# Patient Record
Sex: MALE | Race: WHITE | Employment: OTHER | URBAN - NONMETROPOLITAN AREA
[De-identification: names, ages, dates, MRNs, and addresses within clinical notes are randomized per-mention and may not be internally consistent; named-entity substitution may affect disease eponyms.]

---

## 2020-08-31 ENCOUNTER — VIRTUAL VISIT (OUTPATIENT)
Dept: FAMILY MEDICINE CLINIC | Age: 65
End: 2020-08-31
Payer: COMMERCIAL

## 2020-08-31 DIAGNOSIS — I10 ESSENTIAL HYPERTENSION: ICD-10-CM

## 2020-08-31 DIAGNOSIS — E11.9 DIABETES MELLITUS TYPE 2, DIET-CONTROLLED (HCC): Primary | ICD-10-CM

## 2020-08-31 DIAGNOSIS — I65.21 ATHEROSCLEROSIS OF RIGHT CAROTID ARTERY: ICD-10-CM

## 2020-08-31 DIAGNOSIS — I67.2 CEREBRAL ATHEROSCLEROSIS: ICD-10-CM

## 2020-08-31 DIAGNOSIS — Z91.199 NON-COMPLIANCE: ICD-10-CM

## 2020-08-31 PROCEDURE — 99443 PR PHYS/QHP TELEPHONE EVALUATION 21-30 MIN: CPT | Performed by: FAMILY MEDICINE

## 2020-08-31 RX ORDER — ATORVASTATIN CALCIUM 80 MG/1
80 TABLET, FILM COATED ORAL DAILY
COMMUNITY
End: 2021-10-13 | Stop reason: SDUPTHER

## 2020-08-31 RX ORDER — METFORMIN HYDROCHLORIDE 500 MG/1
2 TABLET, EXTENDED RELEASE ORAL
COMMUNITY
Start: 2020-08-24 | End: 2021-03-21

## 2020-08-31 RX ORDER — LOSARTAN POTASSIUM AND HYDROCHLOROTHIAZIDE 12.5; 1 MG/1; MG/1
1 TABLET ORAL DAILY
COMMUNITY
Start: 2020-01-19 | End: 2021-10-13 | Stop reason: SDUPTHER

## 2020-08-31 RX ORDER — BISMUTH SUBSALICYLATE 262 MG
1 TABLET,CHEWABLE ORAL DAILY
COMMUNITY

## 2020-08-31 RX ORDER — ASPIRIN 81 MG/1
325 TABLET ORAL DAILY
COMMUNITY

## 2020-08-31 RX ORDER — MELATONIN
DAILY
COMMUNITY

## 2020-08-31 RX ORDER — CLOPIDOGREL BISULFATE 75 MG/1
75 TABLET ORAL DAILY
COMMUNITY
Start: 2020-04-16 | End: 2021-04-16

## 2020-08-31 NOTE — PROGRESS NOTES
Evelia Oconnor is a 60 y.o. male, evaluated via audio-only technology on 8/31/2020 for Blood sugar problem  . Assessment & Plan:   Disease noncompliance to medication diabetes mellitus hypertension hyperlipidemia. Patient is seen with telephone to telephone visit his wife is present he is still not taking his medication appropriately I am going to add Januvia 50 mg to his regimen he was on glipizide which gave him some low readings. He does not take his metformin 4 tablets a day due to diarrhea. He has seen vascular surgery they are probably going to stent his carotid artery but he is not sure at this point. We had a long discussion he does not check his blood sugars I talked to his wife he does not take his medications regularly. I really have no idea what else to offer this gentleman we are going to try the 50 mg of Januvia to see what that will do in the follow-up from there in 4 months. This was a telephone to telephone visit I was in my office the patient was at his home with his wife this was a 22-minute visit. 12  Subjective: The patient is a 74-year-old male who is seen for evaluation today. No nausea vomiting or diarrhea. He has been seen by vascular surgery they are considering stenting his carotid artery which caused a stroke he has been a smoker he insists he is not smoking he does not check his blood sugars regularly he has had no falls or injuries he has had no rash no syncope or loss of consciousness. Bowel movements have been appropriate. Eating relatively well. Nobody at home is been sick. No chest pain or shortness of breath. No rashes are noted. He is not taking glipizide his blood sugars were slightly low that was discontinued he can only take more than 2 metformin because of diarrhea he does not check his blood sugars most of the time to know what they are with they are not no bruising no falls or injuries. His speech is not as strong after his stroke.   He does not check his blood pressures at all. He eats relatively well but relatively what he wants. No fever. No rashes. Prior to Admission medications    Medication Sig Start Date End Date Taking? Authorizing Provider   metFORMIN ER (GLUCOPHAGE XR) 500 mg tablet Take 2 Tabs by mouth ACB/HS. 8/24/20  Yes Provider, Historical   atorvastatin (LIPITOR) 80 mg tablet Take 80 mg by mouth daily. Yes Provider, Historical   aspirin delayed-release 81 mg tablet Take 325 mg by mouth daily. Yes Provider, Historical   losartan-hydroCHLOROthiazide (HYZAAR) 100-12.5 mg per tablet Take 1 Tab by mouth daily. 1/19/20  Yes Provider, Historical   clopidogreL (PLAVIX) 75 mg tab Take 75 mg by mouth daily. 4/16/20 4/16/21 Yes Provider, Historical   multivitamin (ONE A DAY) tablet Take 1 Tab by mouth daily. Yes Provider, Historical   cholecalciferol (Vitamin D3) (1000 Units /25 mcg) tablet Take  by mouth daily. Yes Provider, Historical     Patient Active Problem List   Diagnosis Code    Cerebral atherosclerosis I67.2    Hypertension I10    Hyperlipidemia E78.5    Non-compliance Z91.19    Atherosclerosis of right carotid artery I65.21    Diabetes mellitus type 2, diet-controlled ([facility] [address] E11.9       Review of Systems   Constitutional: Negative for weight loss. HENT: Negative. Eyes: Negative. Respiratory: Negative for cough and hemoptysis. Cardiovascular: Negative. Gastrointestinal: Positive for diarrhea. Negative for heartburn and nausea. Genitourinary: Negative for dysuria. Musculoskeletal: Positive for back pain. Neurological: Positive for speech change and focal weakness. Endo/Heme/Allergies: Negative. Psychiatric/Behavioral: Negative for depression and suicidal ideas. No flowsheet data found.      Isis Wtat, who was evaluated through a patient-initiated, synchronous (real-time) audio only encounter, and/or her healthcare decision maker, is aware that it is a billable service, with coverage as determined by his insurance carrier. He provided verbal consent to proceed: n/a- consent obtained within past 12 months. He has not had a related appointment within my department in the past 7 days or scheduled within the next 24 hours.       Total Time: minutes: 21-30 minutes    Jonathan Canada MD

## 2020-08-31 NOTE — PROGRESS NOTES
Mony Bocanegra. presents today for   Chief Complaint   Patient presents with    Blood sugar problem         Depression Screening:  3 most recent PHQ Screens 8/31/2020   Little interest or pleasure in doing things Several days   Feeling down, depressed, irritable, or hopeless Several days   Total Score PHQ 2 2       Learning Assessment:  Learning Assessment 8/31/2020   PRIMARY LEARNER Patient   HIGHEST LEVEL OF EDUCATION - PRIMARY LEARNER  SOME COLLEGE   BARRIERS PRIMARY LEARNER NONE   CO-LEARNER CAREGIVER No   PRIMARY LANGUAGE ENGLISH   LEARNER PREFERENCE PRIMARY DEMONSTRATION   ANSWERED BY patient   RELATIONSHIP SELF       Abuse Screening:  No flowsheet data found. Fall Risk  No flowsheet data found. ADL  No flowsheet data found. Health Maintenance reviewed and discussed and ordered per Provider. Health Maintenance Due   Topic Date Due    Hepatitis C Screening  1955    DTaP/Tdap/Td series (1 - Tdap) 12/27/1978    Shingrix Vaccine Age 50> (1 of 2) 12/27/2007    FOBT Q1Y Age 50-75  12/27/2007   . Coordination of Care:  1. Have you been to the ER, urgent care clinic since your last visit? Hospitalized since your last visit? no    2. Have you seen or consulted any other health care providers outside of the [address] [place] [address] since your last visit? Include any pap smears or colon screening.  no      Last UDS Checked no  Last Pain contract signed: no

## 2020-10-29 ENCOUNTER — HOSPITAL ENCOUNTER (OUTPATIENT)
Dept: LAB | Age: 65
Discharge: HOME | End: 2020-10-29
Payer: COMMERCIAL

## 2020-10-29 ENCOUNTER — OFFICE VISIT (OUTPATIENT)
Dept: FAMILY MEDICINE CLINIC | Age: 65
End: 2020-10-29
Payer: COMMERCIAL

## 2020-10-29 VITALS
TEMPERATURE: 96.9 F | HEIGHT: 70 IN | SYSTOLIC BLOOD PRESSURE: 106 MMHG | HEART RATE: 69 BPM | BODY MASS INDEX: 39.8 KG/M2 | DIASTOLIC BLOOD PRESSURE: 69 MMHG | OXYGEN SATURATION: 93 % | WEIGHT: 278 LBS

## 2020-10-29 DIAGNOSIS — Z91.199 NON-COMPLIANCE: ICD-10-CM

## 2020-10-29 DIAGNOSIS — I67.2 CEREBRAL ATHEROSCLEROSIS: Primary | ICD-10-CM

## 2020-10-29 DIAGNOSIS — E11.9 DIABETES MELLITUS TYPE 2, DIET-CONTROLLED (HCC): ICD-10-CM

## 2020-10-29 DIAGNOSIS — I10 ESSENTIAL HYPERTENSION: ICD-10-CM

## 2020-10-29 DIAGNOSIS — I65.21 ATHEROSCLEROSIS OF RIGHT CAROTID ARTERY: ICD-10-CM

## 2020-10-29 DIAGNOSIS — Z23 NEEDS FLU SHOT: ICD-10-CM

## 2020-10-29 LAB
APPEARANCE UR: CLEAR
BILIRUB UR QL: NEGATIVE
COLOR UR: ABNORMAL
GLUCOSE UR STRIP.AUTO-MCNC: 250 MG/DL
HGB UR QL STRIP: NEGATIVE
KETONES UR QL STRIP.AUTO: NEGATIVE MG/DL
LEUKOCYTE ESTERASE UR QL STRIP.AUTO: NEGATIVE
NITRITE UR QL STRIP.AUTO: NEGATIVE
PH UR STRIP: 6 [PH] (ref 5–9)
PROT UR STRIP-MCNC: NEGATIVE MG/DL
SP GR UR REFRACTOMETRY: 1.02 (ref 1–1.03)
UROBILINOGEN UR QL STRIP.AUTO: 0.2 EU/DL (ref 0.2–1)

## 2020-10-29 PROCEDURE — 90471 IMMUNIZATION ADMIN: CPT | Performed by: FAMILY MEDICINE

## 2020-10-29 PROCEDURE — 82043 UR ALBUMIN QUANTITATIVE: CPT

## 2020-10-29 PROCEDURE — 81003 URINALYSIS AUTO W/O SCOPE: CPT

## 2020-10-29 PROCEDURE — 83036 HEMOGLOBIN GLYCOSYLATED A1C: CPT

## 2020-10-29 PROCEDURE — 36415 COLL VENOUS BLD VENIPUNCTURE: CPT

## 2020-10-29 PROCEDURE — 99213 OFFICE O/P EST LOW 20 MIN: CPT | Performed by: FAMILY MEDICINE

## 2020-10-29 PROCEDURE — 90682 RIV4 VACC RECOMBINANT DNA IM: CPT | Performed by: FAMILY MEDICINE

## 2020-10-29 NOTE — PROGRESS NOTES
Negro Arguelles. presents today for   Chief Complaint   Patient presents with    Follow-up       Is someone accompanying this pt? yes    Is the patient using any DME equipment during OV? no    Depression Screening:  3 most recent PHQ Screens 10/29/2020   Little interest or pleasure in doing things Not at all   Feeling down, depressed, irritable, or hopeless Not at all   Total Score PHQ 2 0       Learning Assessment:  Learning Assessment 10/29/2020   PRIMARY LEARNER Patient   HIGHEST LEVEL OF EDUCATION - PRIMARY LEARNER  -   BARRIERS PRIMARY LEARNER -   CO-LEARNER CAREGIVER -   PRIMARY LANGUAGE ENGLISH   LEARNER PREFERENCE PRIMARY DEMONSTRATION   ANSWERED BY patient   RELATIONSHIP SELF       Abuse Screening:  No flowsheet data found. Fall Risk  No flowsheet data found. ADL  No flowsheet data found. Health Maintenance reviewed and discussed and ordered per Provider. Health Maintenance Due   Topic Date Due    Hepatitis C Screening  1955    Pneumococcal 0-64 years (1 of 1 - PPSV23) 12/27/1963    Foot Exam Q1  12/27/1967    A1C test (Diabetic or Prediabetic)  12/27/1967    MICROALBUMIN Q1  12/27/1967    Eye Exam Retinal or Dilated  12/27/1967    DTaP/Tdap/Td series (1 - Tdap) 12/27/1978    Shingrix Vaccine Age 50> (1 of 2) 12/27/2007    Colorectal Cancer Screening Combo  12/27/2007    Flu Vaccine (1) 09/01/2020   . Coordination of Care:  1. Have you been to the ER, urgent care clinic since your last visit? Hospitalized since your last visit? no    2. Have you seen or consulted any other health care providers outside of the [address] [place] [address] since your last visit? Include any pap smears or colon screening.  no    Providers orders his own labs, orders for colonoscopy, mammograms and referrals as needed    Last UDS Checked no  Last Pain contract signed: no

## 2020-10-29 NOTE — PROGRESS NOTES
Subjective: Venus Gauthier is a 60 y.o. male who was seen for chief complaint of cerebrovascular disease. HPI patient is a 68-year-old male with diabetes hypertension atherosclerosis with previous CVA from noncompliance to medication. His blood sugars are around 200 he has not increase his Metformin to 3 a day. He has had no falls or injuries. His diet is quite irregular. His wife thinks he had a TIA last week it lasted only about 3 or 4 minutes. No rash no syncope or loss of consciousness. Bowel movements have been appropriate. No falls or injuries eating relatively well. Nobody at home is been sick he lives with his wife who tries to watch out for him he insists that he is much more compliant than he has been in the past.  He has had no chest pain or shortness of breath. He is eating relatively well. Nobody at home has been sick. No falls or injuries. Bowel movements have been appropriate no urinary tract symptomatology    Home Medications    Medication Sig Start Date End Date Taking? Authorizing Provider   metFORMIN ER (GLUCOPHAGE XR) 500 mg tablet Take 2 Tabs by mouth ACB/HS. 8/24/20  Yes Provider, Historical   atorvastatin (LIPITOR) 80 mg tablet Take 80 mg by mouth daily. Yes Provider, Historical   aspirin delayed-release 81 mg tablet Take 325 mg by mouth daily. Yes Provider, Historical   losartan-hydroCHLOROthiazide (HYZAAR) 100-12.5 mg per tablet Take 1 Tab by mouth daily. 1/19/20  Yes Provider, Historical   clopidogreL (PLAVIX) 75 mg tab Take 75 mg by mouth daily. 4/16/20 4/16/21 Yes Provider, Historical   multivitamin (ONE A DAY) tablet Take 1 Tab by mouth daily. Yes Provider, Historical   cholecalciferol (Vitamin D3) (1000 Units /25 mcg) tablet Take  by mouth daily. Yes Provider, Historical   SITagliptin (JANUVIA) 50 mg tablet Take 1 Tab by mouth daily.  8/31/20  Yes Shani Altamirano MD      No Known Allergies  Social History     Tobacco Use    Smoking status: Former Smoker     Packs/day: 1.00     Years: 35.00     Pack years: 35.00     Last attempt to quit: 2020     Years since quittin.7    Smokeless tobacco: Never Used   Substance Use Topics    Alcohol use: Never     Frequency: Never    Drug use: Never            Review of Systems   Constitutional: Negative. HENT: Negative. Eyes: Negative. Respiratory: Negative. Cardiovascular: Negative. Gastrointestinal: Negative. Genitourinary: Negative. Musculoskeletal: Negative. Skin: Negative. Neurological: Negative. Hematological: Negative. Psychiatric/Behavioral: Negative. Physical Exam   Objective:     Visit Vitals  /69 (BP 1 Location: Left arm, BP Patient Position: Sitting)   Pulse 69   Temp 96.9 °F (36.1 °C)   Ht 5' 10\" (1.778 m)   Wt 278 lb (126.1 kg)   SpO2 93%   BMI 39.89 kg/m²      General: alert, cooperative, no distress   Mental  status: normal mood, behavior, speech, dress, motor activity, and thought processes, able to follow commands   HENT: NCAT   Neck: no visualized mass   Resp: no respiratory distress   Neuro: no gross deficits   Skin: no discoloration or lesions of concern on visible areas   Psychiatric: normal affect, consistent with stated mood, no evidence of hallucinations   Is obese. His blood pressure is appropriate. His lungs are clear. He has a regular rate and rhythm. No focal findings that can be appreciated on exam he has 1+ edema bilaterally. He is oriented x4. His feet were evaluated. He had good pulses in his feet. He has decreased sensation in both feet however there are no skin breakdowns noted. There is 1+ edema. Assessment & Plan:   Cerebrovascular disease/diabetes/hyperlipidemia/hypertension: The patient has known cerebrovascular disease and is followed by neurology he is on appropriate medication. He does need a urine microalbumin for his diabetes and a hemoglobin A1c level will be ordered.   His pneumonia vaccines are up-to-date I have advised him that he needs to have his diabetic eye exam as well he is going to get that set up. His influenza vaccine will be given. His wife are social distancing appropriately. 712    Additional exam findings: We discussed the expected course, resolution and complications of the diagnosis(es) in detail. Medication risks, benefits, costs, interactions, and alternatives were discussed as indicated. I advised him to contact the office if his condition worsens, changes or fails to improve as anticipated. He expressed understanding with the diagnosis(es) and plan.

## 2020-10-30 LAB
CREAT UR-MCNC: 124 MG/DL
EST. AVERAGE GLUCOSE BLD GHB EST-MCNC: 186 MG/DL
HBA1C MFR BLD: 8.1 % (ref 4–5.6)
MICROALBUMIN UR-MCNC: 5.95 MG/DL
MICROALBUMIN/CREAT UR-RTO: 48 MGMALB/GCRE (ref 0–30)

## 2020-11-07 ENCOUNTER — TELEPHONE (OUTPATIENT)
Dept: FAMILY MEDICINE CLINIC | Age: 65
End: 2020-11-07

## 2020-12-30 DIAGNOSIS — E11.9 DIABETES MELLITUS TYPE 2, DIET-CONTROLLED (HCC): ICD-10-CM

## 2020-12-30 RX ORDER — SITAGLIPTIN 50 MG/1
TABLET, FILM COATED ORAL
Qty: 30 TAB | Refills: 0 | Status: SHIPPED | OUTPATIENT
Start: 2020-12-30 | End: 2021-01-04

## 2021-01-04 DIAGNOSIS — E11.9 DIABETES MELLITUS TYPE 2, DIET-CONTROLLED (HCC): ICD-10-CM

## 2021-01-04 RX ORDER — SITAGLIPTIN 50 MG/1
TABLET, FILM COATED ORAL
Qty: 30 TAB | Refills: 0 | Status: SHIPPED | OUTPATIENT
Start: 2021-01-04 | End: 2021-01-04 | Stop reason: SDUPTHER

## 2021-01-05 ENCOUNTER — TELEPHONE (OUTPATIENT)
Dept: FAMILY MEDICINE CLINIC | Age: 66
End: 2021-01-05

## 2021-01-05 DIAGNOSIS — E11.9 DIABETES MELLITUS TYPE 2, DIET-CONTROLLED (HCC): ICD-10-CM

## 2021-03-21 RX ORDER — METFORMIN HYDROCHLORIDE 500 MG/1
TABLET, EXTENDED RELEASE ORAL
Qty: 120 TAB | Refills: 0 | Status: SHIPPED | OUTPATIENT
Start: 2021-03-21 | End: 2021-10-13 | Stop reason: SDUPTHER

## 2021-10-05 ENCOUNTER — APPOINTMENT (OUTPATIENT)
Dept: CT IMAGING | Age: 66
End: 2021-10-05
Attending: EMERGENCY MEDICINE
Payer: COMMERCIAL

## 2021-10-05 ENCOUNTER — HOSPITAL ENCOUNTER (EMERGENCY)
Age: 66
Discharge: HOME | End: 2021-10-05
Attending: EMERGENCY MEDICINE
Payer: COMMERCIAL

## 2021-10-05 ENCOUNTER — APPOINTMENT (OUTPATIENT)
Dept: GENERAL RADIOLOGY | Age: 66
End: 2021-10-05
Attending: EMERGENCY MEDICINE
Payer: COMMERCIAL

## 2021-10-05 VITALS
HEIGHT: 70 IN | BODY MASS INDEX: 45.1 KG/M2 | WEIGHT: 315 LBS | HEART RATE: 63 BPM | SYSTOLIC BLOOD PRESSURE: 161 MMHG | RESPIRATION RATE: 19 BRPM | OXYGEN SATURATION: 99 % | TEMPERATURE: 98.1 F | DIASTOLIC BLOOD PRESSURE: 74 MMHG

## 2021-10-05 DIAGNOSIS — I69.30 CHRONIC ISCHEMIC RIGHT MCA STROKE: ICD-10-CM

## 2021-10-05 DIAGNOSIS — R20.2 PARESTHESIA: Primary | ICD-10-CM

## 2021-10-05 DIAGNOSIS — E11.65 TYPE 2 DIABETES MELLITUS WITH HYPERGLYCEMIA, UNSPECIFIED WHETHER LONG TERM INSULIN USE (HCC): ICD-10-CM

## 2021-10-05 LAB
ALBUMIN SERPL-MCNC: 3.9 G/DL (ref 3.5–4.7)
ALBUMIN/GLOB SERPL: 1.2 {RATIO}
ALP SERPL-CCNC: 45 U/L (ref 38–126)
ALT SERPL-CCNC: 30 U/L (ref 3–72)
ANION GAP SERPL CALC-SCNC: 8 MMOL/L
AST SERPL W P-5'-P-CCNC: 20 U/L (ref 17–74)
ATRIAL RATE: 61 BPM
BASOPHILS # BLD: 0 K/UL (ref 0–0.1)
BASOPHILS NFR BLD: 0 % (ref 0–2)
BILIRUB SERPL-MCNC: 0.7 MG/DL (ref 0.2–1)
BUN SERPL-MCNC: 15 MG/DL (ref 9–21)
BUN/CREAT SERPL: 17
CA-I BLD-MCNC: 9.3 MG/DL (ref 8.5–10.5)
CALCULATED P AXIS, ECG09: 36 DEGREES
CALCULATED R AXIS, ECG10: -9 DEGREES
CHLORIDE SERPL-SCNC: 100 MMOL/L (ref 94–111)
CK MB SERPL-MCNC: 1.5 NG/ML (ref 5–25)
CK SERPL-CCNC: 96 U/L (ref 38–174)
CO2 SERPL-SCNC: 26 MMOL/L (ref 21–33)
CREAT SERPL-MCNC: 0.9 MG/DL (ref 0.8–1.5)
DIAGNOSIS, 93000: NORMAL
DIFFERENTIAL METHOD BLD: ABNORMAL
EOSINOPHIL # BLD: 0.3 K/UL (ref 0–0.4)
EOSINOPHIL NFR BLD: 2 % (ref 0–5)
ERYTHROCYTE [DISTWIDTH] IN BLOOD BY AUTOMATED COUNT: 12.7 % (ref 11.6–14.5)
GLOBULIN SER CALC-MCNC: 3.2 G/DL
GLUCOSE BLD STRIP.AUTO-MCNC: 269 MG/DL (ref 70–110)
GLUCOSE SERPL-MCNC: 367 MG/DL (ref 70–110)
HCT VFR BLD AUTO: 43.8 % (ref 36–48)
HGB BLD-MCNC: 14.7 G/DL (ref 13–16)
IMM GRANULOCYTES # BLD AUTO: 0.1 K/UL (ref 0–0.04)
IMM GRANULOCYTES NFR BLD AUTO: 1 % (ref 0–0.5)
LYMPHOCYTES # BLD: 3.2 K/UL (ref 0.9–3.6)
LYMPHOCYTES NFR BLD: 29 % (ref 21–52)
MAGNESIUM SERPL-MCNC: 1.9 MG/DL (ref 1.7–2.8)
MCH RBC QN AUTO: 30.1 PG (ref 24–34)
MCHC RBC AUTO-ENTMCNC: 33.6 G/DL (ref 31–37)
MCV RBC AUTO: 89.8 FL (ref 78–100)
MONOCYTES # BLD: 1.2 K/UL (ref 0.05–1.2)
MONOCYTES NFR BLD: 11 % (ref 3–10)
NEUTS SEG # BLD: 6.1 K/UL (ref 1.8–8)
NEUTS SEG NFR BLD: 57 % (ref 40–73)
NRBC # BLD: 0 K/UL (ref 0–0.01)
NRBC BLD-RTO: 0 PER 100 WBC
P-R INTERVAL, ECG05: 158 MS
PERFORMED BY, TECHID: ABNORMAL
PLATELET # BLD AUTO: 191 K/UL (ref 135–420)
PMV BLD AUTO: 10.9 FL (ref 9.2–11.8)
POTASSIUM SERPL-SCNC: 3.7 MMOL/L (ref 3.2–5.1)
PROT SERPL-MCNC: 7.1 G/DL (ref 6.1–8.4)
Q-T INTERVAL, ECG07: 422 MS
QRS DURATION, ECG06: 105 MS
QTC CALCULATION (BEZET), ECG08: 425 MS
RBC # BLD AUTO: 4.88 M/UL (ref 4.35–5.65)
SODIUM SERPL-SCNC: 134 MMOL/L (ref 135–145)
TROPONIN I SERPL-MCNC: <0.02 NG/ML (ref 0.02–0.05)
VENTRICULAR RATE, ECG03: 61 BPM
WBC # BLD AUTO: 10.8 K/UL (ref 4.6–13.2)

## 2021-10-05 PROCEDURE — 83735 ASSAY OF MAGNESIUM: CPT

## 2021-10-05 PROCEDURE — 99285 EMERGENCY DEPT VISIT HI MDM: CPT

## 2021-10-05 PROCEDURE — 74011000636 HC RX REV CODE- 636: Performed by: EMERGENCY MEDICINE

## 2021-10-05 PROCEDURE — 80053 COMPREHEN METABOLIC PANEL: CPT

## 2021-10-05 PROCEDURE — 93005 ELECTROCARDIOGRAM TRACING: CPT

## 2021-10-05 PROCEDURE — 71045 X-RAY EXAM CHEST 1 VIEW: CPT

## 2021-10-05 PROCEDURE — 74011250636 HC RX REV CODE- 250/636: Performed by: EMERGENCY MEDICINE

## 2021-10-05 PROCEDURE — 96360 HYDRATION IV INFUSION INIT: CPT

## 2021-10-05 PROCEDURE — 82553 CREATINE MB FRACTION: CPT

## 2021-10-05 PROCEDURE — 74011250637 HC RX REV CODE- 250/637: Performed by: EMERGENCY MEDICINE

## 2021-10-05 PROCEDURE — 82550 ASSAY OF CK (CPK): CPT

## 2021-10-05 PROCEDURE — 36415 COLL VENOUS BLD VENIPUNCTURE: CPT

## 2021-10-05 PROCEDURE — 70450 CT HEAD/BRAIN W/O DYE: CPT

## 2021-10-05 PROCEDURE — 82962 GLUCOSE BLOOD TEST: CPT

## 2021-10-05 PROCEDURE — 84484 ASSAY OF TROPONIN QUANT: CPT

## 2021-10-05 PROCEDURE — 70496 CT ANGIOGRAPHY HEAD: CPT

## 2021-10-05 PROCEDURE — 85025 COMPLETE CBC W/AUTO DIFF WBC: CPT

## 2021-10-05 RX ORDER — ATORVASTATIN CALCIUM 80 MG/1
80 TABLET, FILM COATED ORAL DAILY
Status: DISCONTINUED | OUTPATIENT
Start: 2021-10-05 | End: 2021-10-05 | Stop reason: RX

## 2021-10-05 RX ORDER — CLOPIDOGREL BISULFATE 75 MG/1
75 TABLET ORAL
Status: COMPLETED | OUTPATIENT
Start: 2021-10-05 | End: 2021-10-05

## 2021-10-05 RX ORDER — GUAIFENESIN 100 MG/5ML
324 LIQUID (ML) ORAL
Status: COMPLETED | OUTPATIENT
Start: 2021-10-05 | End: 2021-10-05

## 2021-10-05 RX ADMIN — SODIUM CHLORIDE 1000 ML: 9 INJECTION, SOLUTION INTRAVENOUS at 02:27

## 2021-10-05 RX ADMIN — IOPAMIDOL 99 ML: 755 INJECTION, SOLUTION INTRAVENOUS at 01:19

## 2021-10-05 RX ADMIN — CLOPIDOGREL BISULFATE 75 MG: 75 TABLET ORAL at 04:30

## 2021-10-05 RX ADMIN — ASPIRIN 324 MG: 81 TABLET, CHEWABLE ORAL at 04:31

## 2021-10-05 NOTE — ED PROVIDER NOTES
EMERGENCY DEPARTMENT HISTORY AND PHYSICAL EXAM      Date: 10/5/2021  Patient Name: Roselia Hassan. History of Presenting Illness     Chief Complaint   Patient presents with    Extremity Weakness       History Provided By: Patient    HPI: Roselia Hassan., 63 y.o. male with a past medical history significant diabetes, hypertension, hyperlipidemia, obesity and stroke presents to the ED, via EMS, with cc of left hand and left foot numbness. Patient states that he was working on his computer approximately 11 PM when he noticed worsening numbness of left hand followed with numbness of left foot. He states he attempted to stand and walk off the computer when left leg felt slightly weak. He also noticed his left peripheral vision has worsened. Also admits to a slight headache with the symptoms. There was no change in his speech. He had no facial droop. He had a stroke, right MCA, 1/20/2020 and had residual sensory loss left hand and left foot and also left peripheral vision loss. He states the symptoms above were slightly worse from baseline. Wife called EMS who brought patient to ED and report [place] scale was negative by their exam and patient's glucose was 276 at scene. Patient admits to stop taking his medications at few months ago. He states he was just tired of taking medications. He denies alcohol. He does not smoke. There are no other complaints, changes, or physical findings at this time. PCP: Jonas Fritz MD    Current Outpatient Medications   Medication Sig Dispense Refill    metFORMIN ER (GLUCOPHAGE XR) 500 mg tablet TAKE 2 TABLETS BY MOUTH DAILY WITH BREAKFAST AND WITH SUPPER (Patient not taking: Reported on 10/5/2021) 120 Tab 0    SITagliptin (Januvia) 50 mg tablet Take 1 Tab by mouth daily. (Patient not taking: Reported on 10/5/2021) 30 Tab 3    atorvastatin (LIPITOR) 80 mg tablet Take 80 mg by mouth daily.  (Patient not taking: Reported on 10/5/2021)      aspirin delayed-release 81 mg tablet Take 325 mg by mouth daily. (Patient not taking: Reported on 10/5/2021)      losartan-hydroCHLOROthiazide (HYZAAR) 100-12.5 mg per tablet Take 1 Tab by mouth daily. (Patient not taking: Reported on 10/5/2021)      multivitamin (ONE A DAY) tablet Take 1 Tab by mouth daily. (Patient not taking: Reported on 10/5/2021)      cholecalciferol (Vitamin D3) (1000 Units /25 mcg) tablet Take  by mouth daily. (Patient not taking: Reported on 10/5/2021)         Past History     Past Medical History:  Past Medical History:   Diagnosis Date    Atherosclerosis of right carotid artery     Cerebral atherosclerosis     Hyperlipidemia     Hypertension     Non-compliance     Stroke [facility])        Past Surgical History:  No past surgical history on file. Family History:  Family History   Problem Relation Age of Onset    Arthritis-osteo Mother     Heart Disease Father     Heart Disease Brother        Social History:  Social History     Tobacco Use    Smoking status: Former Smoker     Packs/day: 1.00     Years: 35.00     Pack years: 35.00     Quit date: 2020     Years since quittin.6    Smokeless tobacco: Never Used   Substance Use Topics    Alcohol use: Never    Drug use: Never       Allergies:  No Known Allergies      Review of Systems     Review of Systems   Constitutional: Negative for chills and fever. HENT: Negative for congestion and sore throat. Respiratory: Negative for cough and shortness of breath. Cardiovascular: Negative for chest pain and palpitations. Gastrointestinal: Negative for abdominal pain, nausea and vomiting. Genitourinary: Negative for dysuria, flank pain and frequency. Musculoskeletal: Negative for arthralgias, joint swelling and myalgias. Skin: Negative for color change and wound. Neurological: Positive for weakness, numbness and headaches.  Negative for dizziness, tremors, seizures, syncope, facial asymmetry, speech difficulty and light-headedness. Hematological: Negative for adenopathy. Physical Exam     Physical Exam  Vitals and nursing note reviewed. Constitutional:       General: He is not in acute distress. Appearance: He is well-developed. He is obese. He is not diaphoretic. Comments: Morbidly obese male, arrived via EMS, noted slight left hand drift, no facial droop, no weakness left side, was sent to CT and stroke code called. HENT:      Head: Normocephalic and atraumatic. No right periorbital erythema or left periorbital erythema. Jaw: No trismus. Right Ear: External ear normal. No drainage or swelling. Tympanic membrane is not perforated, erythematous or bulging. Left Ear: External ear normal. No drainage or swelling. Tympanic membrane is not perforated, erythematous or bulging. Nose: Nose normal. No mucosal edema or rhinorrhea. Right Sinus: No maxillary sinus tenderness or frontal sinus tenderness. Left Sinus: No maxillary sinus tenderness or frontal sinus tenderness. Mouth/Throat:      Mouth: No oral lesions. Dentition: No dental abscesses. Pharynx: Uvula midline. No oropharyngeal exudate, posterior oropharyngeal erythema or uvula swelling. Tonsils: No tonsillar abscesses. Eyes:      General: No scleral icterus. Right eye: No discharge. Left eye: No discharge. Conjunctiva/sclera: Conjunctivae normal.   Cardiovascular:      Rate and Rhythm: Normal rate and regular rhythm. Heart sounds: Normal heart sounds. No murmur heard. No friction rub. No gallop. Pulmonary:      Effort: Pulmonary effort is normal. No tachypnea, accessory muscle usage or respiratory distress. Breath sounds: Normal breath sounds. No decreased breath sounds, wheezing, rhonchi or rales. Abdominal:      General: Bowel sounds are normal. There is no distension. Palpations: Abdomen is soft. Tenderness: There is no abdominal tenderness. Musculoskeletal:         General: No tenderness. Normal range of motion. Cervical back: Normal range of motion and neck supple. Lymphadenopathy:      Cervical: No cervical adenopathy. Skin:     General: Skin is warm and dry. Neurological:      Mental Status: He is alert and oriented to person, place, and time. Mental status is at baseline. Cranial Nerves: No cranial nerve deficit. Sensory: No sensory deficit. Motor: No weakness. Comments: Slight left hand  at arrival.  Otherwise no focal neurological signs noted. On further exam, questionable decreased sensory left hand left foot. Psychiatric:         Mood and Affect: Mood normal.         Behavior: Behavior normal.         Thought Content: Thought content normal.         Judgment: Judgment normal.         Lab and Diagnostic Study Results     Labs -     Recent Results (from the past 12 hour(s))   CBC WITH AUTOMATED DIFF    Collection Time: 10/05/21  1:10 AM   Result Value Ref Range    WBC 10.8 4.6 - 13.2 K/uL    RBC 4.88 4.35 - 5.65 M/uL    HGB 14.7 13.0 - 16.0 g/dL    HCT 43.8 36.0 - 48.0 %    MCV 89.8 78.0 - 100.0 FL    MCH 30.1 24.0 - 34.0 PG    MCHC 33.6 31.0 - 37.0 g/dL    RDW 12.7 11.6 - 14.5 %    PLATELET 315 235 - 445 K/uL    MPV 10.9 9.2 - 11.8 FL    NRBC 0.0 0.0  WBC    ABSOLUTE NRBC 0.00 0.00 - 0.01 K/uL    NEUTROPHILS 57 40 - 73 %    LYMPHOCYTES 29 21 - 52 %    MONOCYTES 11 (H) 3 - 10 %    EOSINOPHILS 2 0 - 5 %    BASOPHILS 0 0 - 2 %    IMMATURE GRANULOCYTES 1 (H) 0 - 0.5 %    ABS. NEUTROPHILS 6.1 1.8 - 8.0 K/UL    ABS. LYMPHOCYTES 3.2 0.9 - 3.6 K/UL    ABS. MONOCYTES 1.2 0.05 - 1.2 K/UL    ABS. EOSINOPHILS 0.3 0.0 - 0.4 K/UL    ABS. BASOPHILS 0.0 0.0 - 0.1 K/UL    ABS. IMM.  GRANS. 0.1 (H) 0.00 - 0.04 K/UL    DF AUTOMATED     METABOLIC PANEL, COMPREHENSIVE    Collection Time: 10/05/21  1:10 AM   Result Value Ref Range    Sodium 134 (L) 135 - 145 mmol/L    Potassium 3.7 3.2 - 5.1 mmol/L    Chloride 100 94 - 111 mmol/L    CO2 26 21 - 33 mmol/L    Anion gap 8 mmol/L    Glucose 367 (H) 70 - 110 mg/dL    BUN 15 9 - 21 mg/dL    Creatinine 0.90 0.8 - 1.50 mg/dL    BUN/Creatinine ratio 17      GFR est AA >60 ml/min/1.73m2    GFR est non-AA >60 ml/min/1.73m2    Calcium 9.3 8.5 - 10.5 mg/dL    Bilirubin, total 0.7 0.2 - 1.0 mg/dL    AST (SGOT) 20 17 - 74 U/L    ALT (SGPT) 30 3 - 72 U/L    Alk. phosphatase 45 38 - 126 U/L    Protein, total 7.1 6.1 - 8.4 g/dL    Albumin 3.9 3.5 - 4.7 g/dL    Globulin 3.2 g/dL    A-G Ratio 1.2     TROPONIN I    Collection Time: 10/05/21  1:10 AM   Result Value Ref Range    Troponin-I, Qt. <0.02 (L) 0.02 - 0.05 ng/mL   CK    Collection Time: 10/05/21  1:10 AM   Result Value Ref Range    CK 96 38 - 174 U/L   CK-MB,QUANT. Collection Time: 10/05/21  1:10 AM   Result Value Ref Range    CK - MB 1.5 ng/mL   MAGNESIUM    Collection Time: 10/05/21  1:10 AM   Result Value Ref Range    Magnesium 1.9 1.7 - 2.8 mg/dL   EKG, 12 LEAD, INITIAL    Collection Time: 10/05/21  1:34 AM   Result Value Ref Range    Ventricular Rate 61 BPM    Atrial Rate 61 BPM    P-R Interval 158 ms    QRS Duration 105 ms    Q-T Interval 422 ms    QTC Calculation (Bezet) 425 ms    Calculated P Axis 36 degrees    Calculated R Axis -9 degrees    Diagnosis Junctional rhythm    GLUCOSE, POC    Collection Time: 10/05/21  4:25 AM   Result Value Ref Range    Glucose (POC) 269 (H) 70 - 110 mg/dL    Performed by Amrita Kamara        Radiologic Studies -   [unfilled]  CT Results  (Last 48 hours)               10/05/21 0055  CT HEAD WO CONT Final result    Impression:      1. No acute intracranial abnormalities. Specifically, no hemorrhage, mass effect   or CT evident acute cortical infarction.        These CODE S results were discussed with Dr. Zahraa Edwards by telephone at [address]   hours on 10/05/2021.       2. Chronic encephalomalacic changes within the right parietal lobe, to include   the right insular region, with slight ex vacuo dilation of the occipital horn of   the right lateral ventricle. 3. Mild sequelae of chronic microvascular ischemia. Narrative:  EXAM: CT head without contrast       CLINICAL INDICATION/HISTORY: Left-sided weakness, history of prior stroke      --Additional history: None. COMPARISON: 01/01/2020       TECHNIQUE: Axial CT imaging of the head was performed without intravenous   contrast. One or more dose reduction techniques were used on this CT: automated   exposure control, adjustment of the mAs and/or kVp according to patient size,   and iterative reconstruction techniques. The specific techniques used on this   CT exam have been documented in the patient's electronic medical record. Digital Imaging and Communications in Medicine (DICOM) format image data are   available to nonaffiliated external healthcare facilities or entities on a   secure, media free, reciprocally searchable basis with patient authorization for   at least a 12 month period after this study. _______________       FINDINGS:       CALVARIUM: Intact. SOFT TISSUES/SCALP: Normal.       SINUSES: Clear. BRAIN AND POSTERIOR FOSSA: There is proportional enlargement of the ventricles   and cerebral spinal fluid spaces consistent with generalized cerebral volume   loss. There is no intracranial hemorrhage, mass effect, or midline shift. There are encephalomalacic changes within the right parietal lobe, including the   right insular ribbon. This results in slight ex vacuo dilation of the occipital   horn of the right lateral ventricle. There are scattered periventricular and   subcortical white matter hypodensities present consistent with nonspecific   gliosis. This is most commonly associated with sequelae of chronic microvascular   ischemia. EXTRA-AXIAL SPACES AND MENINGES: There are no abnormal extra-axial fluid   collections.        OTHER: None.       _______________               CXR Results  (Last 48 hours) 10/05/21 0103  XR CHEST PORT Final result    Impression:  No acute cardiopulmonary findings. Narrative:  EXAM: Chest radiograph       CLINICAL INDICATION/HISTORY: Left-sided paresthesias      --Additional history: None. COMPARISON: 11/02/2017       TECHNIQUE: Frontal view of the chest       _______________       FINDINGS:       SUPPORT DEVICES: None. HEART AND MEDIASTINUM: Cardiac silhouette is normal in size. Normal mediastinal   contours . Normal pulmonary vasculature. LUNGS AND PLEURAL SPACES: No focal airspace opacity. Sharp costophrenic sulci. No pneumothoraces. BONY THORAX AND SOFT TISSUES: Unremarkable.       _______________                 Medical Decision Making and ED Course   - I am the first and primary provider for this patient AND AM THE PRIMARY PROVIDER OF RECORD. - I reviewed the vital signs, available nursing notes, past medical history, past surgical history, family history and social history. - Initial assessment performed. The patients presenting problems have been discussed, and the staff are in agreement with the care plan formulated and outlined with them. I have encouraged them to ask questions as they arise throughout their visit. Vital Signs-Reviewed the patient's vital signs. Patient Vitals for the past 12 hrs:   Temp Pulse Resp BP SpO2   10/05/21 0438  63 19 (!) 161/74 99 %   10/05/21 0402  (!) 57 16 131/66 97 %   10/05/21 0339  (!) 57 16 (!) 159/82 97 %   10/05/21 0300  60 16 125/74 97 %   10/05/21 0230  (!) 54 21 (!) 131/47 98 %   10/05/21 0203  (!) 59 20 (!) 164/84 97 %   10/05/21 0147    (!) 170/71    10/05/21 0147     97 %   10/05/21 0137 98.1 °F (36.7 °C) 62 21  97 %   10/05/21 0132  (!) 57 20 (!) 155/74 97 %       EKG interpretation: (Preliminary): Performed at 01:34, and read at 01:34  Rhythm: normal sinus rhythm; and regular .  Rate (approx.): 61; Axis: normal; SD interval: normal; QRS interval: normal ; ST/T wave: normal; Other findings: .      Records Reviewed: Nursing Notes, Old Medical Records, Previous Radiology Studies and Previous Laboratory Studies    The patient presents with numbness left hand and left foot with a differential diagnosis of paresthesias, TIA, baseline symptoms s/p CVA in past, electrolyte abnormalities, noncompliance, stroke, intracranial bleed, complex migraine    ED Course: Although patient had minimal symptoms at arrival, a stroke code was called and patient taken to CT head which did not show any acute findings. CTA head and neck was also done at that time which showed high-grade stenosis, 80 to 90% origin of right ICA. Patient seen by a telemetry neuro, no TPA recommended. Telemetry neuro also recommended treating patient's hyperglycemia, 367, which was treated with IV fluids down to 269. Patient stopped taking all his medications about 3 months ago, he states he still has the medications at home but was given aspirin, Plavix in ED. Reviewed patient's old medical records and noted patient seen by vascular with his last stroke above ICA lesion was about 70% at that time. He somehow ended up seeing [facility] neurology and was to have endovascular carotid balloon angioplasty and stenting by Dr. Sun Zhao July 2020 but patient never did follow-up although he had agreed to have this procedure done. He states he decided to get a second opinion with Dr. Javier Jung in [place] but patient never did. I offered patient transfer but he wanted to go home and follow-up as an outpatient which is what was recommended by telemetry neuro. I had extensive discussion with patient about compliance with medications as well as follow-up. I also consulted Dr. Leonora Lugo (covering Dr. Sun Zhao) and she also recommended patient follow-up in office. Patient seemed at baseline at discharge but strongly urged to return should his symptoms return or worsen.          Provider Notes (Medical Decision Making):               Consultations:       Consultations: - Tele-Neurology Consultant: Dr. Martinez How: We have asked for emergent assistance with regard to this patient. We have discussed the acute and any chronic neurologic presentations of this patient with our Neurologist partner as well as the findings on the imaging and labs studies available thus far. They are recommending Recommendations on the chart. 1 evaluation of possible intercurrent infection, treatment of hypoglycemia, resume aspirin and Lipitor and Plavix, patient already counseled on the importance of medications compliance and follow-up. Outpatient neurology follow-up. Also consulted with [address] [place] [address] neurology and discussed patient with Dr. James Bunch who advised for patient to set up appointment with Dr. Iglesia Cesar. Procedures and Critical Care             HYPERTENSION COUNSELING: Education was provided to the patient today regarding their hypertension. Patient is made aware of their elevated blood pressure and is instructed to follow up this week with their Primary Care for a recheck. Patient is counseled regarding consequences of chronic, uncontrolled hypertension including kidney disease, heart disease, stroke or even death. Patient states their understanding and agrees to follow up this week. Additionally, during their visit, I discussed sodium restriction, maintaining ideal body weight and regular exercise program as physiologic means to achieve blood pressure control. The patient will strive towards this.       5:29 AM  I have spent 45 minutes of critical care time involved in lab review, consultations with specialist, family decision-making, and documentation. During this entire length of time I was immediately available to the patient. Critical Care:   The reason for providing this level of medical care for this critically ill patient was due a critical illness that impaired one or more vital organ systems such that there was a high probability of imminent or life threatening deterioration in the patients condition. This care involved high complexity decision making to assess, manipulate, and support vital system functions, to treat this degreee vital organ system failure and to prevent further life threatening deterioration of the patients condition. Disposition     Disposition: Condition stable    Diagnosis:   1. Paresthesia    2. Type 2 diabetes mellitus with hyperglycemia, unspecified whether long term insulin use ([facility] [address] [facility]    3. Chronic ischemic right MCA stroke          Disposition:     Follow-up Information     Follow up With Specialties Details Why Contact Info    Aylin Warner MD Neurology Call in 1 day  [address] [facility]  [address]      [facility], [address] In 1 day  [address] [address]  [phone]      [facility] EMERGENCY DEPT Emergency Medicine  If symptoms worsen [place] [id number]  [phone]          Discharge Medication List as of 10/5/2021  4:37 AM      CONTINUE these medications which have NOT CHANGED    Details   metFORMIN ER (GLUCOPHAGE XR) 500 mg tablet TAKE 2 TABLETS BY MOUTH DAILY WITH BREAKFAST AND WITH SUPPER, Normal, Disp-120 Tab, R-0      SITagliptin (Januvia) 50 mg tablet Take 1 Tab by mouth daily. , Normal, Disp-30 Tab, R-3      atorvastatin (LIPITOR) 80 mg tablet Take 80 mg by mouth daily. , Historical Med      aspirin delayed-release 81 mg tablet Take 325 mg by mouth daily. , Historical Med      losartan-hydroCHLOROthiazide (HYZAAR) 100-12.5 mg per tablet Take 1 Tab by mouth daily. , Historical Med      multivitamin (ONE A DAY) tablet Take 1 Tab by mouth daily. , Historical Med      cholecalciferol (Vitamin D3) (1000 Units /25 mcg) tablet Take  by mouth daily. , Historical Med             Remove if not discharged  DISCHARGE PLAN:  1.    Current Discharge Medication List      CONTINUE these medications which have NOT CHANGED    Details   metFORMIN ER (GLUCOPHAGE XR) 500 mg tablet TAKE 2 TABLETS BY MOUTH DAILY WITH BREAKFAST AND WITH SUPPER  Qty: 120 Tab, Refills: 0      SITagliptin (Januvia) 50 mg tablet Take 1 Tab by mouth daily. Qty: 30 Tab, Refills: 3    Associated Diagnoses: Diabetes mellitus type 2, diet-controlled (HCC)      atorvastatin (LIPITOR) 80 mg tablet Take 80 mg by mouth daily. aspirin delayed-release 81 mg tablet Take 325 mg by mouth daily. losartan-hydroCHLOROthiazide (HYZAAR) 100-12.5 mg per tablet Take 1 Tab by mouth daily. multivitamin (ONE A DAY) tablet Take 1 Tab by mouth daily. cholecalciferol (Vitamin D3) (1000 Units /25 mcg) tablet Take  by mouth daily. 2.   Follow-up Information     Follow up With Specialties Details Why Franco Rivera MD Neurology Call in 1 day  [address] [facility]      Roderick Floyd MD Family Medicine In 1 day  [place] [address]  [phone]      [facility] EMERGENCY DEPT Emergency Medicine  If symptoms worsen [place] [id number]  [phone]        3. Return to ED if worse   4. Discharge Medication List as of 10/5/2021  4:37 AM          Diagnosis     Clinical Impression:   1. Paresthesia    2. Type 2 diabetes mellitus with hyperglycemia, unspecified whether long term insulin use ([facility]    3. Chronic ischemic right MCA stroke        Attestations:    Radha Thapa MD    Please note that this dictation was completed with SeeToo, the computer voice recognition software. Quite often unanticipated grammatical, syntax, homophones, and other interpretive errors are inadvertently transcribed by the computer software. Please disregard these errors. Please excuse any errors that have escaped final proofreading. Thank you.

## 2021-10-05 NOTE — ED TRIAGE NOTES
Patient states 1 hour prior to admission patient developed numbness left hand and left foot, pain behind right eye.

## 2021-10-12 ENCOUNTER — OFFICE VISIT (OUTPATIENT)
Dept: FAMILY MEDICINE CLINIC | Age: 66
End: 2021-10-12

## 2021-10-12 VITALS — WEIGHT: 284 LBS | BODY MASS INDEX: 40.75 KG/M2

## 2021-10-12 DIAGNOSIS — E11.9 DIABETES MELLITUS TYPE 2, DIET-CONTROLLED (HCC): ICD-10-CM

## 2021-10-12 RX ORDER — ATORVASTATIN CALCIUM 80 MG/1
80 TABLET, FILM COATED ORAL DAILY
Qty: 90 TABLET | Refills: 1 | Status: CANCELLED | OUTPATIENT
Start: 2021-10-12

## 2021-10-12 RX ORDER — LOSARTAN POTASSIUM AND HYDROCHLOROTHIAZIDE 12.5; 1 MG/1; MG/1
1 TABLET ORAL DAILY
Qty: 90 TABLET | Refills: 1 | Status: CANCELLED | OUTPATIENT
Start: 2021-10-12

## 2021-10-12 RX ORDER — METFORMIN HYDROCHLORIDE 500 MG/1
TABLET, EXTENDED RELEASE ORAL
Qty: 120 TABLET | Refills: 1 | Status: CANCELLED | OUTPATIENT
Start: 2021-10-12

## 2021-10-12 NOTE — PROGRESS NOTES
Fatuma Webster. presents today for No chief complaint on file. Is someone accompanying this pt? no    Is the patient using any DME equipment during [address]? no    Depression Screening:  3 most recent PHQ Screens 10/29/2020   Little interest or pleasure in doing things Not at all   Feeling down, depressed, irritable, or hopeless Not at all   Total Score PHQ 2 0       Learning Assessment:  Learning Assessment 10/29/2020   PRIMARY LEARNER Patient   HIGHEST LEVEL OF EDUCATION - PRIMARY LEARNER  -   BARRIERS PRIMARY LEARNER -   CO-LEARNER CAREGIVER -   PRIMARY LANGUAGE ENGLISH   LEARNER PREFERENCE PRIMARY DEMONSTRATION   ANSWERED BY patient   RELATIONSHIP SELF       Fall Risk  No flowsheet data found. ADL  No flowsheet data found. Travel Screening:    Travel Screening     Question   Response    In the last month, have you been in contact with someone who was confirmed or suspected to have Coronavirus / COVID-19? No / Unsure    Have you had a COVID-19 viral test in the last 14 days? No    Do you have any of the following new or worsening symptoms? None of these    Have you traveled internationally or domestically in the last month? No      Travel History   Travel since 09/12/21     No documented travel since 09/12/21          Health Maintenance reviewed and discussed and ordered per Provider. Health Maintenance Due   Topic Date Due    Hepatitis C Screening  Never done    Eye Exam Retinal or Dilated  Never done    COVID-19 Vaccine (1) Never done    DTaP/Tdap/Td series (1 - Tdap) Never done    Colorectal Cancer Screening Combo  Never done    Shingrix Vaccine Age 50> (1 of 2) Never done    Low dose CT lung screening  Never done    Lipid Screen  01/12/2021    Flu Vaccine (1) 09/01/2021    Foot Exam Q1  10/29/2021    MICROALBUMIN Q1  10/29/2021   . Coordination of Care:  1. Have you been to the ER, urgent care clinic since your last visit? Hospitalized since your last visit? no    2.  Have you seen or consulted any other health care providers outside of the [address] [place] [address] since your last visit? Include any pap smears or colon screening.  no

## 2021-10-13 ENCOUNTER — VIRTUAL VISIT (OUTPATIENT)
Dept: FAMILY MEDICINE CLINIC | Age: 66
End: 2021-10-13
Payer: COMMERCIAL

## 2021-10-13 DIAGNOSIS — I10 ESSENTIAL HYPERTENSION: Primary | ICD-10-CM

## 2021-10-13 DIAGNOSIS — E55.9 VITAMIN D DEFICIENCY: ICD-10-CM

## 2021-10-13 DIAGNOSIS — E11.9 DIABETES MELLITUS TYPE 2, DIET-CONTROLLED (HCC): ICD-10-CM

## 2021-10-13 DIAGNOSIS — E78.2 MIXED HYPERLIPIDEMIA: ICD-10-CM

## 2021-10-13 PROCEDURE — 99443 PR PHYS/QHP TELEPHONE EVALUATION 21-30 MIN: CPT | Performed by: NURSE PRACTITIONER

## 2021-10-13 RX ORDER — LOSARTAN POTASSIUM AND HYDROCHLOROTHIAZIDE 12.5; 1 MG/1; MG/1
1 TABLET ORAL DAILY
Qty: 90 TABLET | Refills: 1 | Status: SHIPPED | OUTPATIENT
Start: 2021-10-13 | End: 2022-09-13

## 2021-10-13 RX ORDER — ATORVASTATIN CALCIUM 80 MG/1
80 TABLET, FILM COATED ORAL DAILY
Qty: 90 TABLET | Refills: 1 | Status: SHIPPED | OUTPATIENT
Start: 2021-10-13 | End: 2021-12-09

## 2021-10-13 RX ORDER — METFORMIN HYDROCHLORIDE 500 MG/1
TABLET, EXTENDED RELEASE ORAL
Qty: 120 TABLET | Refills: 0 | Status: SHIPPED | OUTPATIENT
Start: 2021-10-13 | End: 2022-01-17 | Stop reason: SDUPTHER

## 2021-10-13 RX ORDER — BLOOD SUGAR DIAGNOSTIC
STRIP MISCELLANEOUS
Qty: 100 STRIP | Refills: 1 | Status: SHIPPED | OUTPATIENT
Start: 2021-10-13

## 2021-10-13 NOTE — PROGRESS NOTES
Preethi Mcknight. presents today for   Chief Complaint   Patient presents with    Follow-up       Virtual/telephone visit    Depression Screening:  3 most recent PHQ Screens 10/29/2020   Little interest or pleasure in doing things Not at all   Feeling down, depressed, irritable, or hopeless Not at all   Total Score PHQ 2 0       Learning Assessment:  Learning Assessment 10/29/2020   PRIMARY LEARNER Patient   HIGHEST LEVEL OF EDUCATION - PRIMARY LEARNER  -   BARRIERS PRIMARY LEARNER -   CO-LEARNER CAREGIVER -   PRIMARY LANGUAGE ENGLISH   LEARNER PREFERENCE PRIMARY DEMONSTRATION   ANSWERED BY patient   RELATIONSHIP SELF       Fall Risk  No flowsheet data found. ADL  No flowsheet data found. Travel Screening:    Travel Screening     Question   Response    In the last month, have you been in contact with someone who was confirmed or suspected to have Coronavirus / COVID-19? No / Unsure    Have you had a COVID-19 viral test in the last 14 days? No    Do you have any of the following new or worsening symptoms? None of these    Have you traveled internationally or domestically in the last month? No      Travel History   Travel since 09/13/21     No documented travel since 09/13/21          Health Maintenance reviewed and discussed and ordered per Provider. Health Maintenance Due   Topic Date Due    Eye Exam Retinal or Dilated  Never done    COVID-19 Vaccine (1) Never done    DTaP/Tdap/Td series (1 - Tdap) Never done    Colorectal Cancer Screening Combo  Never done    Shingrix Vaccine Age 50> (1 of 2) Never done    Low dose CT lung screening  Never done    Lipid Screen  01/12/2021    Flu Vaccine (1) 09/01/2021    Foot Exam Q1  10/29/2021    MICROALBUMIN Q1  10/29/2021   . Coordination of Care:  1. \"Have you been to the ER, urgent care clinic since your last visit? Hospitalized since your last visit? \" No    2.  \"Have you seen or consulted any other health care providers outside of the [facility] [address] [facility] since your last visit? \" no     3. For patients aged 39-70: Has the patient had a colonoscopy? Yes, HM satisfied with blue hyperlink     If the patient is female:    4. For patients aged 41-77: Has the patient had a mammogram within the past 2 years? No     5. For patients aged 21-65: Has the patient had a pap smear?  No

## 2021-10-13 NOTE — PROGRESS NOTES
Joaquin Sierra. is a 63 y.o. male, evaluated via audio-only technology on 10/13/2021 for No chief complaint on file. .    Assessment & Plan:   1. Essential hyptertension. Continue Losartan/HCTZ 100/12.5 mg tablet daily management of essential hypertension. 2.  Type 2 diabetes. Continue Sitagliptin 50 mg tablet daily and Metformin 500 mg tablet, take 2 tablets twice daily for management of type 2 diabetes. 3.  Mixed hyperlipidemia. Continue Atorvastatin 80 mg tablet daily for management of mixed hyperlipidemia. 4.  Vitamin D deficiency. Continue Cholecalciferol (Vitamin D3) 1000 units/25 MCG tablet daily for management of vitamin D deficiency. 12  Subjective:   Patient reports no pain or discomfort at this time. Patient reports recent ED visit after he felt dizzy and had left side weakness. Patient reports he was told his symptoms could have been related to elevated blood pressure or blood sugar. Patient reports he was not compliant with prescribed antihypertensive or type 2 diabetes medications. Patient reports he will follow up with Neurologist 10/15/2021 for procedure. He is followed by neurologist due to history of CVA 1 year ago. Prior to Admission medications    Medication Sig Start Date End Date Taking? Authorizing Provider   metFORMIN ER (GLUCOPHAGE XR) 500 mg tablet TAKE 2 TABLETS BY MOUTH DAILY WITH BREAKFAST AND WITH SUPPER 3/21/21   Arnoldo Stone MD   SITagliptin (Januvia) 50 mg tablet Take 1 Tab by mouth daily. 1/5/21   Arnoldo Stone MD   atorvastatin (LIPITOR) 80 mg tablet Take 80 mg by mouth daily. Provider, Historical   aspirin delayed-release 81 mg tablet Take 325 mg by mouth daily. Provider, Historical   losartan-hydroCHLOROthiazide (HYZAAR) 100-12.5 mg per tablet Take 1 Tablet by mouth daily. 1/19/20   Provider, Historical   multivitamin (ONE A DAY) tablet Take 1 Tablet by mouth daily.     Provider, Historical   cholecalciferol (Vitamin D3) (1000 Units /25 mcg) tablet Take  by mouth daily. Provider, Historical       No flowsheet data found. Roxana Anahi, who was evaluated through a patient-initiated, synchronous (real-time) audio only encounter, and/or her healthcare decision maker, is aware that it is a billable service, with coverage as determined by his insurance carrier. He provided verbal consent to proceed: Yes. He has not had a related appointment within my department in the past 7 days or scheduled within the next 24 hours. On this date 10/13/2021 I have spent 25 minutes reviewing previous notes, test results and face to face (virtual) with the patient discussing the diagnosis and importance of compliance with the treatment plan as well as documenting on the day of the visit.     Jennine Leventhal, NP

## 2021-11-11 ENCOUNTER — CLINICAL SUPPORT (OUTPATIENT)
Dept: FAMILY MEDICINE CLINIC | Age: 66
End: 2021-11-11
Payer: COMMERCIAL

## 2021-11-11 DIAGNOSIS — Z23 ENCOUNTER FOR IMMUNIZATION: Primary | ICD-10-CM

## 2021-11-11 PROCEDURE — 90694 VACC AIIV4 NO PRSRV 0.5ML IM: CPT | Performed by: STUDENT IN AN ORGANIZED HEALTH CARE EDUCATION/TRAINING PROGRAM

## 2021-11-11 PROCEDURE — 90471 IMMUNIZATION ADMIN: CPT | Performed by: STUDENT IN AN ORGANIZED HEALTH CARE EDUCATION/TRAINING PROGRAM

## 2021-11-11 NOTE — PROGRESS NOTES
Doug Coffey is a 63 y.o. male who presents for routine immunizations. He denies any symptoms , reactions or allergies that would exclude them from being immunized today. Risks and adverse reactions were discussed and the VIS was given to them. All questions were addressed. He left office without being observed, at that time there was no reactions observed. The vaccine was given in the left deltoid.      Sosa Baez

## 2021-12-09 ENCOUNTER — OFFICE VISIT (OUTPATIENT)
Dept: FAMILY MEDICINE CLINIC | Age: 66
End: 2021-12-09
Payer: COMMERCIAL

## 2021-12-09 VITALS
OXYGEN SATURATION: 96 % | SYSTOLIC BLOOD PRESSURE: 139 MMHG | HEART RATE: 77 BPM | BODY MASS INDEX: 40.75 KG/M2 | WEIGHT: 284 LBS | DIASTOLIC BLOOD PRESSURE: 71 MMHG

## 2021-12-09 DIAGNOSIS — Z01.818 PREOPERATIVE CLEARANCE: Primary | ICD-10-CM

## 2021-12-09 DIAGNOSIS — I10 ESSENTIAL HYPERTENSION: ICD-10-CM

## 2021-12-09 DIAGNOSIS — E78.2 MIXED HYPERLIPIDEMIA: ICD-10-CM

## 2021-12-09 DIAGNOSIS — E11.9 DIABETES MELLITUS TYPE 2, DIET-CONTROLLED (HCC): ICD-10-CM

## 2021-12-09 LAB — HBA1C MFR BLD HPLC: 10 %

## 2021-12-09 PROCEDURE — 83036 HEMOGLOBIN GLYCOSYLATED A1C: CPT | Performed by: NURSE PRACTITIONER

## 2021-12-09 PROCEDURE — 99214 OFFICE O/P EST MOD 30 MIN: CPT | Performed by: NURSE PRACTITIONER

## 2021-12-09 RX ORDER — BLOOD SUGAR DIAGNOSTIC
STRIP MISCELLANEOUS
Qty: 100 STRIP | Refills: 1 | Status: CANCELLED | OUTPATIENT
Start: 2021-12-09

## 2021-12-09 RX ORDER — CLOPIDOGREL BISULFATE 75 MG/1
75 TABLET ORAL DAILY
COMMUNITY
Start: 2021-11-16

## 2021-12-09 RX ORDER — ATORVASTATIN CALCIUM 80 MG/1
80 TABLET, FILM COATED ORAL DAILY
Qty: 90 TABLET | Refills: 1 | Status: CANCELLED | OUTPATIENT
Start: 2021-12-09

## 2021-12-09 RX ORDER — METFORMIN HYDROCHLORIDE 500 MG/1
TABLET, EXTENDED RELEASE ORAL
Qty: 120 TABLET | Refills: 0 | Status: CANCELLED | OUTPATIENT
Start: 2021-12-09

## 2021-12-09 RX ORDER — ATORVASTATIN CALCIUM 80 MG/1
80 TABLET, FILM COATED ORAL EVERY OTHER DAY
Qty: 90 TABLET | Refills: 1 | Status: SHIPPED | OUTPATIENT
Start: 2021-12-09

## 2021-12-09 RX ORDER — ASPIRIN 81 MG/1
325 TABLET ORAL DAILY
Status: CANCELLED | OUTPATIENT
Start: 2021-12-09

## 2021-12-09 RX ORDER — LOSARTAN POTASSIUM AND HYDROCHLOROTHIAZIDE 12.5; 1 MG/1; MG/1
1 TABLET ORAL DAILY
Qty: 90 TABLET | Refills: 1 | Status: CANCELLED | OUTPATIENT
Start: 2021-12-09

## 2021-12-09 NOTE — PROGRESS NOTES
Indio Chawla. presents today for   Chief Complaint   Patient presents with    Pre-op Exam     pre op for suregery 12/14       Is someone accompanying this pt? no    Is the patient using any DME equipment during OV? yes    Depression Screening:  3 most recent PHQ Screens 12/9/2021   Little interest or pleasure in doing things Not at all   Feeling down, depressed, irritable, or hopeless Not at all   Total Score PHQ 2 0       Learning Assessment:  Learning Assessment 10/29/2020   PRIMARY LEARNER Patient   HIGHEST LEVEL OF EDUCATION - PRIMARY LEARNER  -   BARRIERS PRIMARY LEARNER -   CO-LEARNER CAREGIVER -   PRIMARY LANGUAGE ENGLISH   LEARNER PREFERENCE PRIMARY DEMONSTRATION   ANSWERED BY patient   RELATIONSHIP SELF       Fall Risk  No flowsheet data found. ADL  No flowsheet data found. Travel Screening:    Travel Screening     Question   Response    In the last month, have you been in contact with someone who was confirmed or suspected to have Coronavirus / COVID-19? No / Unsure    Have you had a COVID-19 viral test in the last 14 days? No    Do you have any of the following new or worsening symptoms? None of these    Have you traveled internationally or domestically in the last month? No      Travel History   Travel since 11/09/21    No documented travel since 11/09/21         Health Maintenance reviewed and discussed and ordered per Provider. Health Maintenance Due   Topic Date Due    Eye Exam Retinal or Dilated  Never done    COVID-19 Vaccine (1) Never done    DTaP/Tdap/Td series (1 - Tdap) Never done    Colorectal Cancer Screening Combo  Never done    Shingrix Vaccine Age 50> (1 of 2) Never done    Low dose CT lung screening  Never done    Lipid Screen  01/12/2021    Foot Exam Q1  10/29/2021    A1C test (Diabetic or Prediabetic)  10/29/2021    MICROALBUMIN Q1  10/29/2021   . Coordination of Care:  1. \"Have you been to the ER, urgent care clinic since your last visit? Hospitalized since your last visit? \" No    2. \"Have you seen or consulted any other health care providers outside of the [address] [place] [address] since your last visit? \" Yes Where: [facility] Dr. Hafsa Giraldo      3. For patients aged 39-70: Has the patient had a colonoscopy? No     If the patient is female:    4. For patients aged 41-77: Has the patient had a mammogram within the past 2 years? NA based on age or sex    11. For patients aged 21-65: Has the patient had a pap smear?  NA based on age or sex

## 2021-12-09 NOTE — PROGRESS NOTES
History of Present Illness  Tushar Hernadez is a 63 y.o. male who presents today for:    Chief Complaint   Patient presents with    Pre-op Exam     pre op for suregery        Past Medical History  Past Medical History:   Diagnosis Date    Atherosclerosis of right carotid artery     Cerebral atherosclerosis     Hyperlipidemia     Hypertension     Non-compliance     Stroke [facility])         Surgical History  History reviewed. No pertinent surgical history. Current Medications  Current Outpatient Medications   Medication Sig    clopidogreL (PLAVIX) 75 mg tab Take 75 mg by mouth daily.  atorvastatin (LIPITOR) 80 mg tablet Take 1 Tablet by mouth daily.  losartan-hydroCHLOROthiazide (HYZAAR) 100-12.5 mg per tablet Take 1 Tablet by mouth daily.  metFORMIN ER (GLUCOPHAGE XR) 500 mg tablet TAKE 2 TABLETS BY MOUTH DAILY WITH BREAKFAST AND WITH SUPPER    SITagliptin (Januvia) 50 mg tablet Take 1 Tablet by mouth daily.  glucose blood VI test strips (OneTouch Ultra Test) strip Use 1 test strip to obtain single drop of blood for daily monitoring of blood glucose    aspirin delayed-release 81 mg tablet Take 325 mg by mouth daily.  multivitamin (ONE A DAY) tablet Take 1 Tablet by mouth daily.  cholecalciferol (Vitamin D3) (1000 Units /25 mcg) tablet Take  by mouth daily. No current facility-administered medications for this visit.        Allergies/Drug Reactions  No Known Allergies     Family History  Family History   Problem Relation Age of Onset    OSTEOARTHRITIS Mother     Heart Disease Father     Heart Disease Brother         Social History  Social History     Tobacco Use    Smoking status: Former Smoker     Packs/day: 1.00     Years: 35.00     Pack years: 35.00     Quit date: 2020     Years since quittin.8    Smokeless tobacco: Never Used   Substance Use Topics    Alcohol use: Never    Drug use: Never        Health Maintenance   Topic Date Due    Eye Exam Retinal or Dilated  Never done    COVID-19 Vaccine (1) Never done    DTaP/Tdap/Td series (1 - Tdap) Never done    Colorectal Cancer Screening Combo  Never done    Shingrix Vaccine Age 50> (1 of 2) Never done    Low dose CT lung screening  Never done    Lipid Screen  01/12/2021    Foot Exam Q1  10/29/2021    A1C test (Diabetic or Prediabetic)  10/29/2021    MICROALBUMIN Q1  10/29/2021    Flu Vaccine  Completed    Pneumococcal 0-64 years  Aged Out    Hepatitis C Screening  Discontinued     Immunization History   Administered Date(s) Administered    Influenza Vaccine (Quadrivalent)(>18 Yrs Flublok [id number]) 10/29/2020    Influenza, Quadrivalent, Adjuvanted (>65 Yrs FLUAD QUAD [id number]) 11/11/2021       Review of Systems  ROS     Physical Exam  Vital signs:   Vitals:    12/09/21 1503   BP: 139/71   Pulse: 77   SpO2: 96%   Weight: 284 lb (128.8 kg)       Physical Exam     General: alert, oriented, not in distress  Head: scalp normal, atraumatic  Eyes: pupils are equal and reactive, full and intact EOM's  Ears: patent ear canal, intact tympanic membrane  Nose: normal turbinates, no congestion or discharge  Lips/Mouth: moist lips and buccal mucosa, non-enlarged tonsils, pink throat  Neck: supple, no JVD, no lymphadenopathy, non-palpable thyroid  Chest/Lungs: clear breath sounds, no wheezing or crackles  Heart: normal rate, regular rhythm, no murmur  Abdomen: soft, non-distended, non-tender, normal bowel sounds, no organomegaly, no masses  Extremities: no focal deformities, no edema  Skin: no active skin lesions    Laboratory/Tests:  Admission on 10/05/2021, Discharged on 10/05/2021   Component Date Value Ref Range Status    WBC 10/05/2021 10.8  4.6 - 13.2 K/uL Final    RBC 10/05/2021 4.88  4.35 - 5.65 M/uL Final    HGB 10/05/2021 14.7  13.0 - 16.0 g/dL Final    HCT 10/05/2021 43.8  36.0 - 48.0 % Final    MCV 10/05/2021 89.8  78.0 - 100.0 FL Final    MCH 10/05/2021 30.1  24.0 - 34.0 PG Final    MCHC 10/05/2021 33.6 31.0 - 37.0 g/dL Final    RDW 10/05/2021 12.7  11.6 - 14.5 % Final    PLATELET 88/61/9028 320  135 - 420 K/uL Final    MPV 10/05/2021 10.9  9.2 - 11.8 FL Final    NRBC 10/05/2021 0.0  0.0  WBC Final    ABSOLUTE NRBC 10/05/2021 0.00  0.00 - 0.01 K/uL Final    NEUTROPHILS 10/05/2021 57  40 - 73 % Final    LYMPHOCYTES 10/05/2021 29  21 - 52 % Final    MONOCYTES 10/05/2021 11* 3 - 10 % Final    EOSINOPHILS 10/05/2021 2  0 - 5 % Final    BASOPHILS 10/05/2021 0  0 - 2 % Final    IMMATURE GRANULOCYTES 10/05/2021 1* 0 - 0.5 % Final    ABS. NEUTROPHILS 10/05/2021 6.1  1.8 - 8.0 K/UL Final    ABS. LYMPHOCYTES 10/05/2021 3.2  0.9 - 3.6 K/UL Final    ABS. MONOCYTES 10/05/2021 1.2  0.05 - 1.2 K/UL Final    ABS. EOSINOPHILS 10/05/2021 0.3  0.0 - 0.4 K/UL Final    ABS. BASOPHILS 10/05/2021 0.0  0.0 - 0.1 K/UL Final    ABS. IMM. GRANS. 10/05/2021 0.1* 0.00 - 0.04 K/UL Final    DF 10/05/2021 AUTOMATED    Final    Sodium 10/05/2021 134* 135 - 145 mmol/L Final    Potassium 10/05/2021 3.7  3.2 - 5.1 mmol/L Final    Chloride 10/05/2021 100  94 - 111 mmol/L Final    CO2 10/05/2021 26  21 - 33 mmol/L Final    Anion gap 10/05/2021 8  mmol/L Final    Glucose 10/05/2021 367* 70 - 110 mg/dL Final    BUN 10/05/2021 15  9 - 21 mg/dL Final    Creatinine 10/05/2021 0.90  0.8 - 1.50 mg/dL Final    BUN/Creatinine ratio 10/05/2021 17    Final    GFR est AA 10/05/2021 >60  ml/min/1.73m2 Final    GFR est non-AA 10/05/2021 >60  ml/min/1.73m2 Final    Comment: Estimated GFR is calculated using the IDMS-traceable Modification of Diet in Renal Disease (MDRD) Study equation, reported for both  Americans (GFRAA) and non- Americans (GFRNA), and normalized to 1.73m2 body surface area. The physician must decide which value applies to the patient. The MDRD study equation should only be used in individuals age 25 or older.  It has not been validated for the following: pregnant women, patients with serious comorbid conditions, or on certain medications, or persons with extremes of body size, muscle mass, or nutritional status.  Calcium 10/05/2021 9.3  8.5 - 10.5 mg/dL Final    Bilirubin, total 10/05/2021 0.7  0.2 - 1.0 mg/dL Final    AST (SGOT) 10/05/2021 20  17 - 74 U/L Final    ALT (SGPT) 10/05/2021 30  3 - 72 U/L Final    Alk. phosphatase 10/05/2021 45  38 - 126 U/L Final    Protein, total 10/05/2021 7.1  6.1 - 8.4 g/dL Final    Albumin 10/05/2021 3.9  3.5 - 4.7 g/dL Final    Globulin 10/05/2021 3.2  g/dL Final    A-G Ratio 10/05/2021 1.2    Final    Troponin-I, Qt. 10/05/2021 <0.02* 0.02 - 0.05 ng/mL Final    CK 10/05/2021 96  38 - 174 U/L Final    CK - MB 10/05/2021 1.5  ng/mL Final    Magnesium 10/05/2021 1.9  1.7 - 2.8 mg/dL Final    Ventricular Rate 10/05/2021 61  BPM Final    Atrial Rate 10/05/2021 61  BPM Final    P-R Interval 10/05/2021 158  ms Final    QRS Duration 10/05/2021 105  ms Final    Q-T Interval 10/05/2021 422  ms Final    QTC Calculation (Bezet) 10/05/2021 425  ms Final    Calculated P Axis 10/05/2021 36  degrees Final    Calculated R Axis 10/05/2021 -9  degrees Final    Diagnosis 10/05/2021    Final                    Value:Normal sinus rhythm  Normal ECG  When compared with ECG of 1/1/2020  R wave progression has improved        Confirmed by Ricarda Rodriguez ([id number]) on 10/5/2021 10:24:50 AM      Glucose (POC) 10/05/2021 269* 70 - 110 mg/dL Final    Performed by 10/05/2021 Jonathan Baca   Final     Patient reports history of CVA January 1, 2020 and 2 subsequent TIAs after initial CVA. He reports 2nd TIA was November 4, 2021. Patient reports he will have right carotid stent placement December 14, 2021. Patient reports home blood glucose results are usually greater than 200 mg/dl daily. Will order POC A1c at this time. POC A1c: 10.0 on 12/9/2021. Will increase Januvia from 50 mg daily to Januvia 100 mg daily.   Patient reports he takes his prescribed Metformin 500 mg BID and no more due to increase in GI symptoms. Patient reports he takes his prescribed Lipitor 80 mg every other day instead of daily as previously prescribed due to bilateral knee joint aches. Assessment/Plan:    1. Type 2 diabetes. Increased Januvia from 50 mg daily to Januvia 100 mg daily and continue Metformin 500 mg BID for management of type 2 diabetes. 2. Essential hypertension. Continue Losartan/HCTZ 100/12.5 mg tablet daily for management of essential hypertension. 3.  Preoperative clearance. Patient is approved for right carotid artery stent placement surgery on December 14, 2021. I have discussed the diagnosis with the patient and the intended plan as seen in the above orders. The patient has received an after-visit summary and questions were answered concerning future plans. I have discussed medication side effects and warnings with the patient as well. I have reviewed the plan of care with the patient, accepted their input and they are in agreement with the treatment goals.        Chikis Sales NP  December 9, 2021

## 2022-01-13 DIAGNOSIS — I10 ESSENTIAL HYPERTENSION: ICD-10-CM

## 2022-01-13 DIAGNOSIS — E78.2 MIXED HYPERLIPIDEMIA: ICD-10-CM

## 2022-01-13 DIAGNOSIS — E11.9 DIABETES MELLITUS TYPE 2, DIET-CONTROLLED (HCC): ICD-10-CM

## 2022-01-17 ENCOUNTER — OFFICE VISIT (OUTPATIENT)
Dept: FAMILY MEDICINE CLINIC | Age: 67
End: 2022-01-17
Payer: COMMERCIAL

## 2022-01-17 VITALS
SYSTOLIC BLOOD PRESSURE: 124 MMHG | OXYGEN SATURATION: 97 % | HEART RATE: 82 BPM | DIASTOLIC BLOOD PRESSURE: 74 MMHG | WEIGHT: 290.6 LBS | BODY MASS INDEX: 41.6 KG/M2 | HEIGHT: 70 IN

## 2022-01-17 DIAGNOSIS — E11.9 DIABETES MELLITUS TYPE 2, DIET-CONTROLLED (HCC): ICD-10-CM

## 2022-01-17 DIAGNOSIS — E78.2 MIXED HYPERLIPIDEMIA: ICD-10-CM

## 2022-01-17 DIAGNOSIS — I65.21 ATHEROSCLEROSIS OF RIGHT CAROTID ARTERY: Primary | ICD-10-CM

## 2022-01-17 DIAGNOSIS — I10 ESSENTIAL HYPERTENSION: ICD-10-CM

## 2022-01-17 PROCEDURE — 99214 OFFICE O/P EST MOD 30 MIN: CPT | Performed by: STUDENT IN AN ORGANIZED HEALTH CARE EDUCATION/TRAINING PROGRAM

## 2022-01-17 RX ORDER — METFORMIN HYDROCHLORIDE 500 MG/1
TABLET, EXTENDED RELEASE ORAL
Qty: 120 TABLET | Refills: 0 | Status: SHIPPED | OUTPATIENT
Start: 2022-01-17 | End: 2022-06-02

## 2022-01-17 NOTE — PROGRESS NOTES
Juan Blair. presents today for   Chief Complaint   Patient presents with    Follow-up     after surgery  stent put in right carotid artery        Is someone accompanying this pt? Mirta Loja     Is the patient using any DME equipment during OV? No     Depression Screening:  3 most recent PHQ Screens 1/17/2022   Little interest or pleasure in doing things Not at all   Feeling down, depressed, irritable, or hopeless Not at all   Total Score PHQ 2 0       Learning Assessment:  Learning Assessment 10/29/2020   PRIMARY LEARNER Patient   HIGHEST LEVEL OF EDUCATION - PRIMARY LEARNER  -   BARRIERS PRIMARY LEARNER -   CO-LEARNER CAREGIVER -   PRIMARY LANGUAGE ENGLISH   LEARNER PREFERENCE PRIMARY DEMONSTRATION   ANSWERED BY patient   RELATIONSHIP SELF       Fall Risk  No flowsheet data found. Health Maintenance reviewed and discussed and ordered per Provider. Health Maintenance Due   Topic Date Due    COVID-19 Vaccine (1) Never done    Eye Exam Retinal or Dilated  Never done    DTaP/Tdap/Td series (1 - Tdap) Never done    Colorectal Cancer Screening Combo  Never done    Shingrix Vaccine Age 50> (1 of 2) Never done    Low dose CT lung screening  Never done    Lipid Screen  01/12/2021    A1C test (Diabetic or Prediabetic)  01/29/2021    Foot Exam Q1  10/29/2021    MICROALBUMIN Q1  10/29/2021   . Coordination of Care:  1. Have you been to the ER, urgent care clinic since your last visit? Hospitalized since your last visit? Hospital 12/14/21-12/16/21 stent put in     2. Have you seen or consulted any other health care providers outside of the [address] [place] [address] since your last visit? Include any pap smears or colon screening.  No

## 2022-01-21 NOTE — PROGRESS NOTES
Subjective: Moira Negron is a 61 y.o. male who was seen for Follow-up (after surgery  stent put in right carotid artery )    Patient recently had a stent placed in his right carotid artery. He tolerated procedure well and has no complaints. His sugars are still not well controlled. Has been compliant with all of his medications. A1c done last month which was elevated at 10    Home Medications    Medication Sig Start Date End Date Taking? Authorizing Provider   metFORMIN ER (GLUCOPHAGE XR) 500 mg tablet TAKE 2 TABLETS BY MOUTH DAILY WITH BREAKFAST AND WITH SUPPER 22  Yes Liz Enriquez MD   empagliflozin (JARDIANCE) 25 mg tablet Take 1 Tablet by mouth daily. 22  Yes Liz Enriquez MD   SITagliptin (Januvia) 100 mg tablet Take 0.5 Tablets by mouth daily. 21  Yes Melania MORALES NP   clopidogreL (PLAVIX) 75 mg tab Take 75 mg by mouth daily. 21  Yes Provider, Historical   atorvastatin (LIPITOR) 80 mg tablet Take 1 Tablet by mouth every other day. 21  Yes Shannon Alexander NP   losartan-hydroCHLOROthiazide (HYZAAR) 100-12.5 mg per tablet Take 1 Tablet by mouth daily. 10/13/21  Yes Melania MORALES NP   glucose blood VI test strips (OneTouch Ultra Test) strip Use 1 test strip to obtain single drop of blood for daily monitoring of blood glucose 10/13/21  Yes Melania MORALES NP   aspirin delayed-release 81 mg tablet Take 325 mg by mouth daily. Yes Provider, Historical   multivitamin (ONE A DAY) tablet Take 1 Tablet by mouth daily. Yes Provider, Historical   cholecalciferol (Vitamin D3) (1000 Units /25 mcg) tablet Take  by mouth daily.    Yes Provider, Historical      No Known Allergies  Social History     Tobacco Use    Smoking status: Former Smoker     Packs/day: 1.00     Years: 35.00     Pack years: 35.00     Quit date: 2020     Years since quittin.9    Smokeless tobacco: Never Used   Substance Use Topics    Alcohol use: Never    Drug use: Never            Review of Systems   All other systems reviewed and are negative.          Objective:     Visit Vitals  /74 (BP 1 Location: Left upper arm, BP Patient Position: Sitting, BP Cuff Size: Large adult long)   Pulse 82   Ht 5' 10\" (1.778 m)   Wt 290 lb 9.6 oz (131.8 kg)   SpO2 97%   BMI 41.70 kg/m²        General: alert, oriented, not in distress  Head: scalp normal, atraumatic  Eyes: pupils are equal and reactive, full and intact EOM's  Ears: patent ear canal, intact tympanic membrane  Nose: normal turbinates, no congestion or discharge  Lips/Mouth: moist lips and buccal mucosa, non-enlarged tonsils, pink throat  Neck: supple, no JVD, no lymphadenopathy, non-palpable thyroid  Chest/Lungs: clear breath sounds, no wheezing or crackles  Heart: normal rate, regular rhythm, no murmur  Abdomen: soft, non-distended, non-tender, normal bowel sounds, no organomegaly, no masses  Extremities: no focal deformities, no edema  Skin: no active skin lesions    Laboratory/Tests:  Office Visit on 12/09/2021   Component Date Value Ref Range Status    Hemoglobin A1c (POC) 12/09/2021 10.0  % Preliminary   Admission on 10/05/2021, Discharged on 10/05/2021   Component Date Value Ref Range Status    WBC 10/05/2021 10.8  4.6 - 13.2 K/uL Final    RBC 10/05/2021 4.88  4.35 - 5.65 M/uL Final    HGB 10/05/2021 14.7  13.0 - 16.0 g/dL Final    HCT 10/05/2021 43.8  36.0 - 48.0 % Final    MCV 10/05/2021 89.8  78.0 - 100.0 FL Final    MCH 10/05/2021 30.1  24.0 - 34.0 PG Final    MCHC 10/05/2021 33.6  31.0 - 37.0 g/dL Final    RDW 10/05/2021 12.7  11.6 - 14.5 % Final    PLATELET 50/83/1878 347  135 - 420 K/uL Final    MPV 10/05/2021 10.9  9.2 - 11.8 FL Final    NRBC 10/05/2021 0.0  0.0  WBC Final    ABSOLUTE NRBC 10/05/2021 0.00  0.00 - 0.01 K/uL Final    NEUTROPHILS 10/05/2021 57  40 - 73 % Final    LYMPHOCYTES 10/05/2021 29  21 - 52 % Final    MONOCYTES 10/05/2021 11* 3 - 10 % Final    EOSINOPHILS 10/05/2021 2  0 - 5 % Final    BASOPHILS 10/05/2021 0  0 - 2 % Final    IMMATURE GRANULOCYTES 10/05/2021 1* 0 - 0.5 % Final    ABS. NEUTROPHILS 10/05/2021 6.1  1.8 - 8.0 K/UL Final    ABS. LYMPHOCYTES 10/05/2021 3.2  0.9 - 3.6 K/UL Final    ABS. MONOCYTES 10/05/2021 1.2  0.05 - 1.2 K/UL Final    ABS. EOSINOPHILS 10/05/2021 0.3  0.0 - 0.4 K/UL Final    ABS. BASOPHILS 10/05/2021 0.0  0.0 - 0.1 K/UL Final    ABS. IMM. GRANS. 10/05/2021 0.1* 0.00 - 0.04 K/UL Final    DF 10/05/2021 AUTOMATED    Final    Sodium 10/05/2021 134* 135 - 145 mmol/L Final    Potassium 10/05/2021 3.7  3.2 - 5.1 mmol/L Final    Chloride 10/05/2021 100  94 - 111 mmol/L Final    CO2 10/05/2021 26  21 - 33 mmol/L Final    Anion gap 10/05/2021 8  mmol/L Final    Glucose 10/05/2021 367* 70 - 110 mg/dL Final    BUN 10/05/2021 15  9 - 21 mg/dL Final    Creatinine 10/05/2021 0.90  0.8 - 1.50 mg/dL Final    BUN/Creatinine ratio 10/05/2021 17    Final    GFR est AA 10/05/2021 >60  ml/min/1.73m2 Final    GFR est non-AA 10/05/2021 >60  ml/min/1.73m2 Final    Comment: Estimated GFR is calculated using the IDMS-traceable Modification of Diet in Renal Disease (MDRD) Study equation, reported for both  Americans (GFRAA) and non- Americans (GFRNA), and normalized to 1.73m2 body surface area. The physician must decide which value applies to the patient. The MDRD study equation should only be used in individuals age 25 or older. It has not been validated for the following: pregnant women, patients with serious comorbid conditions, or on certain medications, or persons with extremes of body size, muscle mass, or nutritional status.  Calcium 10/05/2021 9.3  8.5 - 10.5 mg/dL Final    Bilirubin, total 10/05/2021 0.7  0.2 - 1.0 mg/dL Final    AST (SGOT) 10/05/2021 20  17 - 74 U/L Final    ALT (SGPT) 10/05/2021 30  3 - 72 U/L Final    Alk.  phosphatase 10/05/2021 45  38 - 126 U/L Final    Protein, total 10/05/2021 7.1  6.1 - 8.4 g/dL Final  Albumin 10/05/2021 3.9  3.5 - 4.7 g/dL Final    Globulin 10/05/2021 3.2  g/dL Final    A-G Ratio 10/05/2021 1.2    Final    Troponin-I, Qt. 10/05/2021 <0.02* 0.02 - 0.05 ng/mL Final    CK 10/05/2021 96  38 - 174 U/L Final    CK - MB 10/05/2021 1.5  ng/mL Final    Magnesium 10/05/2021 1.9  1.7 - 2.8 mg/dL Final    Ventricular Rate 10/05/2021 61  BPM Final    Atrial Rate 10/05/2021 61  BPM Final    P-R Interval 10/05/2021 158  ms Final    QRS Duration 10/05/2021 105  ms Final    Q-T Interval 10/05/2021 422  ms Final    QTC Calculation (Bezet) 10/05/2021 425  ms Final    Calculated P Axis 10/05/2021 36  degrees Final    Calculated R Axis 10/05/2021 -9  degrees Final    Diagnosis 10/05/2021    Final                    Value:Normal sinus rhythm  Normal ECG  When compared with ECG of 1/1/2020  R wave progression has improved        Confirmed by Gus Gottron ([id number]) on 10/5/2021 10:24:50 AM      Glucose (POC) 10/05/2021 269* 70 - 110 mg/dL Final    Performed by 10/05/2021 Hue Austin   Final         Assessment & Plan:     1. Diabetes mellitus type 2, diet-controlled (HCC)  Uncontrolled. We will start Jardiance and continue metformin and Januvia. A1c at 10  - metFORMIN ER (GLUCOPHAGE XR) 500 mg tablet; TAKE 2 TABLETS BY MOUTH DAILY WITH BREAKFAST AND WITH SUPPER  Dispense: 120 Tablet; Refill: 0    2. Atherosclerosis of right carotid artery  Status post stent placement. Continue aspirin, Plavix, statin    3. Mixed hyperlipidemia  Continue statin    4. Essential hypertension  Continue Hyzaar             I have discussed the diagnosis with the patient and the intended plan as seen in the above orders. The patient has received an after-visit summary and questions were answered concerning future plans. I have discussed medication side effects and warnings with the patient as well.  I have reviewed the plan of care with the patient, accepted their input and they are in agreement with the treatment goals. Previous lab and imaging results were reviewed by me.        Tess Man MD  January 21, 2022

## 2022-02-10 RX ORDER — EMPAGLIFLOZIN 25 MG/1
TABLET, FILM COATED ORAL
Qty: 90 TABLET | Refills: 0 | Status: SHIPPED | OUTPATIENT
Start: 2022-02-10 | End: 2022-07-06

## 2022-02-11 ENCOUNTER — APPOINTMENT (OUTPATIENT)
Dept: CT IMAGING | Age: 67
End: 2022-02-11
Attending: INTERNAL MEDICINE
Payer: COMMERCIAL

## 2022-02-11 ENCOUNTER — NURSE TRIAGE (OUTPATIENT)
Dept: OTHER | Facility: CLINIC | Age: 67
End: 2022-02-11

## 2022-02-11 ENCOUNTER — HOSPITAL ENCOUNTER (EMERGENCY)
Age: 67
Discharge: HOME | End: 2022-02-11
Attending: INTERNAL MEDICINE
Payer: COMMERCIAL

## 2022-02-11 ENCOUNTER — TELEPHONE (OUTPATIENT)
Dept: FAMILY MEDICINE CLINIC | Age: 67
End: 2022-02-11

## 2022-02-11 ENCOUNTER — APPOINTMENT (OUTPATIENT)
Dept: GENERAL RADIOLOGY | Age: 67
End: 2022-02-11
Attending: INTERNAL MEDICINE
Payer: COMMERCIAL

## 2022-02-11 VITALS
HEIGHT: 70 IN | TEMPERATURE: 98.7 F | DIASTOLIC BLOOD PRESSURE: 66 MMHG | BODY MASS INDEX: 38.65 KG/M2 | OXYGEN SATURATION: 99 % | WEIGHT: 270 LBS | RESPIRATION RATE: 18 BRPM | HEART RATE: 69 BPM | SYSTOLIC BLOOD PRESSURE: 139 MMHG

## 2022-02-11 DIAGNOSIS — R53.83 FATIGUE, UNSPECIFIED TYPE: Primary | ICD-10-CM

## 2022-02-11 DIAGNOSIS — Z20.822 CONTACT WITH AND (SUSPECTED) EXPOSURE TO COVID-19: ICD-10-CM

## 2022-02-11 LAB
ALBUMIN SERPL-MCNC: 3.8 G/DL (ref 3.5–4.7)
ALBUMIN/GLOB SERPL: 0.9 {RATIO}
ALP SERPL-CCNC: 54 U/L (ref 38–126)
ALT SERPL-CCNC: 19 U/L (ref 3–72)
ANION GAP SERPL CALC-SCNC: 14 MMOL/L
APPEARANCE UR: CLEAR
AST SERPL W P-5'-P-CCNC: 26 U/L (ref 17–74)
ATRIAL RATE: 76 BPM
BACTERIA URNS QL MICRO: NEGATIVE /HPF
BASOPHILS # BLD: 0 K/UL (ref 0–0.1)
BASOPHILS NFR BLD: 0 % (ref 0–2)
BILIRUB DIRECT SERPL-MCNC: 0.2 MG/DL (ref 0–0.3)
BILIRUB SERPL-MCNC: 1 MG/DL (ref 0.2–1)
BILIRUB UR QL: NEGATIVE
BUN SERPL-MCNC: 13 MG/DL (ref 9–21)
BUN/CREAT SERPL: 19
CA-I BLD-MCNC: 8.9 MG/DL (ref 8.5–10.5)
CALCULATED P AXIS, ECG09: 17 DEGREES
CALCULATED R AXIS, ECG10: -21 DEGREES
CALCULATED T AXIS, ECG11: -5 DEGREES
CHLORIDE SERPL-SCNC: 94 MMOL/L (ref 94–111)
CO2 SERPL-SCNC: 23 MMOL/L (ref 21–33)
COLOR UR: YELLOW
CREAT SERPL-MCNC: 0.7 MG/DL (ref 0.8–1.5)
DIAGNOSIS, 93000: NORMAL
DIFFERENTIAL METHOD BLD: ABNORMAL
EOSINOPHIL # BLD: 0 K/UL (ref 0–0.4)
EOSINOPHIL NFR BLD: 0 % (ref 0–5)
EPITH CASTS URNS QL MICRO: ABNORMAL /LPF (ref 0–20)
ERYTHROCYTE [DISTWIDTH] IN BLOOD BY AUTOMATED COUNT: 12.2 % (ref 11.6–14.5)
GLOBULIN SER CALC-MCNC: 4.3 G/DL
GLUCOSE SERPL-MCNC: 132 MG/DL (ref 70–110)
GLUCOSE UR STRIP.AUTO-MCNC: >1000 MG/DL
HCT VFR BLD AUTO: 46.2 % (ref 36–48)
HGB BLD-MCNC: 15.4 G/DL (ref 13–16)
HGB UR QL STRIP: NEGATIVE
IMM GRANULOCYTES # BLD AUTO: 0.1 K/UL (ref 0–0.04)
IMM GRANULOCYTES NFR BLD AUTO: 1 % (ref 0–0.5)
KETONES UR QL STRIP.AUTO: >80 MG/DL
LACTATE SERPL-SCNC: 1.4 MMOL/L (ref 0.5–2)
LEUKOCYTE ESTERASE UR QL STRIP.AUTO: NEGATIVE
LYMPHOCYTES # BLD: 2.1 K/UL (ref 0.9–3.6)
LYMPHOCYTES NFR BLD: 25 % (ref 21–52)
MAGNESIUM SERPL-MCNC: 2.2 MG/DL (ref 1.7–2.8)
MCH RBC QN AUTO: 29.8 PG (ref 24–34)
MCHC RBC AUTO-ENTMCNC: 33.3 G/DL (ref 31–37)
MCV RBC AUTO: 89.5 FL (ref 78–100)
MONOCYTES # BLD: 1.2 K/UL (ref 0.05–1.2)
MONOCYTES NFR BLD: 15 % (ref 3–10)
NEUTS SEG # BLD: 4.9 K/UL (ref 1.8–8)
NEUTS SEG NFR BLD: 59 % (ref 40–73)
NITRITE UR QL STRIP.AUTO: NEGATIVE
NRBC # BLD: 0 K/UL (ref 0–0.01)
NRBC BLD-RTO: 0 PER 100 WBC
P-R INTERVAL, ECG05: 146 MS
PH UR STRIP: 5.5 [PH] (ref 5–8)
PLATELET # BLD AUTO: 182 K/UL (ref 135–420)
PMV BLD AUTO: 10.6 FL (ref 9.2–11.8)
POTASSIUM SERPL-SCNC: 3.7 MMOL/L (ref 3.2–5.1)
PROT SERPL-MCNC: 8.1 G/DL (ref 6.1–8.4)
PROT UR STRIP-MCNC: 100 MG/DL
Q-T INTERVAL, ECG07: 380 MS
QRS DURATION, ECG06: 108 MS
QTC CALCULATION (BEZET), ECG08: 428 MS
RBC # BLD AUTO: 5.16 M/UL (ref 4.35–5.65)
RBC #/AREA URNS HPF: ABNORMAL /HPF (ref 0–2)
SODIUM SERPL-SCNC: 131 MMOL/L (ref 135–145)
SP GR UR REFRACTOMETRY: >1.03 (ref 1–1.03)
TROPONIN I SERPL-MCNC: <0.02 NG/ML (ref 0.02–0.05)
UROBILINOGEN UR QL STRIP.AUTO: 1 EU/DL (ref 0.2–1)
VENTRICULAR RATE, ECG03: 76 BPM
WBC # BLD AUTO: 8.3 K/UL (ref 4.6–13.2)
WBC URNS QL MICRO: ABNORMAL /HPF (ref 0–4)

## 2022-02-11 PROCEDURE — 83735 ASSAY OF MAGNESIUM: CPT

## 2022-02-11 PROCEDURE — 93005 ELECTROCARDIOGRAM TRACING: CPT

## 2022-02-11 PROCEDURE — 85025 COMPLETE CBC W/AUTO DIFF WBC: CPT

## 2022-02-11 PROCEDURE — 80076 HEPATIC FUNCTION PANEL: CPT

## 2022-02-11 PROCEDURE — 81001 URINALYSIS AUTO W/SCOPE: CPT

## 2022-02-11 PROCEDURE — 99284 EMERGENCY DEPT VISIT MOD MDM: CPT

## 2022-02-11 PROCEDURE — 80048 BASIC METABOLIC PNL TOTAL CA: CPT

## 2022-02-11 PROCEDURE — 70450 CT HEAD/BRAIN W/O DYE: CPT

## 2022-02-11 PROCEDURE — 74011250636 HC RX REV CODE- 250/636: Performed by: INTERNAL MEDICINE

## 2022-02-11 PROCEDURE — 83605 ASSAY OF LACTIC ACID: CPT

## 2022-02-11 PROCEDURE — 71045 X-RAY EXAM CHEST 1 VIEW: CPT

## 2022-02-11 PROCEDURE — 84484 ASSAY OF TROPONIN QUANT: CPT

## 2022-02-11 RX ADMIN — SODIUM CHLORIDE 1000 ML: 9 INJECTION, SOLUTION INTRAVENOUS at 17:55

## 2022-02-11 NOTE — ED PROVIDER NOTES
EMERGENCY DEPARTMENT HISTORY AND PHYSICAL EXAM      Date: 2/11/2022  Patient Name: Curtis Moss. History of Presenting Illness     Chief Complaint   Patient presents with    Dizziness    Altered mental status    Cough       History Provided By: Patient    HPI: Curtis Moss., 61 y.o. male with a past medical history significant for hypertension ; right carotid stenosis with carotid stent 12/'21; HLD; stroke '20 with residual left sided weakness that presents to the ED with cc of stumbling; feels off balance; his brain feels empty and confused for the past 2 weeks. Symptoms started after he was put on Januvia for his DM and also after he visited his son who was diagnosed with Covid shortly after he visited him. Pt has not been tested for covid and has not taken the vaccines. He states to mild cough productive of small yellow sputum but no SOB. He also states that he has no appetite and has not eaten a good meal since Friday a week ago. He is unsteady but has not fallen. States that his taste was off since after the stroke but it's worse now. No loss of smell; no diarrhea. There are no other complaints, changes, or physical findings at this time. PCP: Roslyn Watts NP    Current Facility-Administered Medications   Medication Dose Route Frequency Provider Last Rate Last Admin    sodium chloride 0.9 % bolus infusion 1,000 mL  1,000 mL IntraVENous ONCE Awais Mc MD 1,000 mL/hr at 02/11/22 1755 1,000 mL at 02/11/22 1755     Current Outpatient Medications   Medication Sig Dispense Refill    Jardiance 25 mg tablet Take 1 tablet by mouth once daily 90 Tablet 0    metFORMIN ER (GLUCOPHAGE XR) 500 mg tablet TAKE 2 TABLETS BY MOUTH DAILY WITH BREAKFAST AND WITH SUPPER 120 Tablet 0    SITagliptin (Januvia) 100 mg tablet Take 0.5 Tablets by mouth daily. 90 Tablet 1    clopidogreL (PLAVIX) 75 mg tab Take 75 mg by mouth daily.       atorvastatin (LIPITOR) 80 mg tablet Take 1 Tablet by mouth every other day. 90 Tablet 1    losartan-hydroCHLOROthiazide (HYZAAR) 100-12.5 mg per tablet Take 1 Tablet by mouth daily. 90 Tablet 1    glucose blood VI test strips (OneTouch Ultra Test) strip Use 1 test strip to obtain single drop of blood for daily monitoring of blood glucose 100 Strip 1    aspirin delayed-release 81 mg tablet Take 325 mg by mouth daily.  multivitamin (ONE A DAY) tablet Take 1 Tablet by mouth daily.  cholecalciferol (Vitamin D3) (1000 Units /25 mcg) tablet Take  by mouth daily. Past History     Past Medical History:  Past Medical History:   Diagnosis Date    Atherosclerosis of right carotid artery     Cerebral atherosclerosis     Hyperlipidemia     Hypertension     Non-compliance     Stroke [facility]        Past Surgical History:  Past Surgical History:   Procedure Laterality Date    HX CAROTID STENT         Family History:  Family History   Problem Relation Age of Onset    OSTEOARTHRITIS Mother     Heart Disease Father     Heart Disease Brother        Social History:  Social History     Tobacco Use    Smoking status: Former Smoker     Packs/day: 1.00     Years: 35.00     Pack years: 35.00     Quit date: 2020     Years since quittin.0    Smokeless tobacco: Never Used   Substance Use Topics    Alcohol use: Never    Drug use: Never       Allergies:  No Known Allergies      Review of Systems     Review of Systems   Constitutional: Positive for fatigue. Negative for chills and fever. HENT: Negative for sore throat and trouble swallowing. Eyes: Negative for visual disturbance. Respiratory: Positive for cough. Negative for chest tightness, shortness of breath and wheezing. Cardiovascular: Negative for chest pain. Gastrointestinal: Negative for abdominal pain, diarrhea, nausea and vomiting. Genitourinary: Negative for dysuria, flank pain and genital sores. Musculoskeletal: Negative for neck pain.    Neurological: Positive for dizziness and light-headedness. Negative for syncope, speech difficulty and weakness. Psychiatric/Behavioral: Positive for confusion and decreased concentration. Physical Exam     Physical Exam  Vitals and nursing note reviewed. Constitutional:       Appearance: He is well-developed. He is obese. He is not diaphoretic. HENT:      Head: Normocephalic. Mouth/Throat:      Pharynx: No oropharyngeal exudate. Eyes:      Extraocular Movements: Extraocular movements intact. Right eye: Normal extraocular motion. Left eye: Normal extraocular motion. Pupils: Pupils are equal, round, and reactive to light. Cardiovascular:      Rate and Rhythm: Normal rate and regular rhythm. Heart sounds: Normal heart sounds. No murmur heard. Pulmonary:      Effort: Pulmonary effort is normal. No respiratory distress. Breath sounds: No wheezing. Abdominal:      General: Bowel sounds are normal. There is no distension. Palpations: Abdomen is soft. Tenderness: There is no abdominal tenderness. There is no rebound. Musculoskeletal:         General: No swelling or tenderness. Normal range of motion. Cervical back: Neck supple. Skin:     General: Skin is warm and dry. Neurological:      Mental Status: He is alert and oriented to person, place, and time. GCS: GCS eye subscore is 4. GCS verbal subscore is 5. GCS motor subscore is 6. Cranial Nerves: No cranial nerve deficit or facial asymmetry. Coordination: Coordination normal.      Gait: Gait normal.      Comments: Left hand trends upwards during drift testing. States that the left side of the body feels less but it's not really a loss of feeling.  Mild left leg weakness   Psychiatric:         Behavior: Behavior normal.         Lab and Diagnostic Study Results     Labs -     Recent Results (from the past 12 hour(s))   EKG, 12 LEAD, INITIAL    Collection Time: 02/11/22  3:21 PM   Result Value Ref Range Ventricular Rate 76 BPM    Atrial Rate 76 BPM    P-R Interval 146 ms    QRS Duration 108 ms    Q-T Interval 380 ms    QTC Calculation (Bezet) 428 ms    Calculated P Axis 17 degrees    Calculated R Axis -21 degrees    Calculated T Axis -5 degrees    Diagnosis       Sinus rhythm  Borderline left axis deviation  Borderline T abnormalities, inferior leads     CBC WITH AUTOMATED DIFF    Collection Time: 02/11/22  3:25 PM   Result Value Ref Range    WBC 8.3 4.6 - 13.2 K/uL    RBC 5.16 4.35 - 5.65 M/uL    HGB 15.4 13.0 - 16.0 g/dL    HCT 46.2 36.0 - 48.0 %    MCV 89.5 78.0 - 100.0 FL    MCH 29.8 24.0 - 34.0 PG    MCHC 33.3 31.0 - 37.0 g/dL    RDW 12.2 11.6 - 14.5 %    PLATELET 203 530 - 734 K/uL    MPV 10.6 9.2 - 11.8 FL    NRBC 0.0 0.0  WBC    ABSOLUTE NRBC 0.00 0.00 - 0.01 K/uL    NEUTROPHILS 59 40 - 73 %    LYMPHOCYTES 25 21 - 52 %    MONOCYTES 15 (H) 3 - 10 %    EOSINOPHILS 0 0 - 5 %    BASOPHILS 0 0 - 2 %    IMMATURE GRANULOCYTES 1 (H) 0 - 0.5 %    ABS. NEUTROPHILS 4.9 1.8 - 8.0 K/UL    ABS. LYMPHOCYTES 2.1 0.9 - 3.6 K/UL    ABS. MONOCYTES 1.2 0.05 - 1.2 K/UL    ABS. EOSINOPHILS 0.0 0.0 - 0.4 K/UL    ABS. BASOPHILS 0.0 0.0 - 0.1 K/UL    ABS. IMM.  GRANS. 0.1 (H) 0.00 - 0.04 K/UL    DF AUTOMATED     METABOLIC PANEL, BASIC    Collection Time: 02/11/22  3:25 PM   Result Value Ref Range    Sodium 131 (L) 135 - 145 mmol/L    Potassium 3.7 3.2 - 5.1 mmol/L    Chloride 94 94 - 111 mmol/L    CO2 23 21 - 33 mmol/L    Anion gap 14 mmol/L    Glucose 132 (H) 70 - 110 mg/dL    BUN 13 9 - 21 mg/dL    Creatinine 0.70 (L) 0.8 - 1.50 mg/dL    BUN/Creatinine ratio 19      GFR est AA >60 ml/min/1.73m2    GFR est non-AA >60 ml/min/1.73m2    Calcium 8.9 8.5 - 10.5 mg/dL   TROPONIN I    Collection Time: 02/11/22  3:25 PM   Result Value Ref Range    Troponin-I, Qt. <0.02 (L) 0.02 - 0.05 ng/mL   HEPATIC FUNCTION PANEL    Collection Time: 02/11/22  3:25 PM   Result Value Ref Range    Protein, total 8.1 6.1 - 8.4 g/dL    Albumin 3.8 3.5 - 4.7 g/dL    Globulin 4.3 g/dL    A-G Ratio 0.9      Bilirubin, total 1.0 0.2 - 1.0 mg/dL    Bilirubin, direct 0.2 0.0 - 0.3 mg/dL    Alk. phosphatase 54 38 - 126 U/L    AST (SGOT) 26 17 - 74 U/L    ALT (SGPT) 19 3 - 72 U/L   LACTIC ACID    Collection Time: 02/11/22  3:25 PM   Result Value Ref Range    Lactic acid 1.4 0.5 - 2.0 mmol/L   MAGNESIUM    Collection Time: 02/11/22  3:25 PM   Result Value Ref Range    Magnesium 2.2 1.7 - 2.8 mg/dL   URINALYSIS W/ RFLX MICROSCOPIC    Collection Time: 02/11/22  4:10 PM   Result Value Ref Range    Color Yellow      Appearance Clear      Specific gravity >1.030 (H) 1.005 - 1.030    pH (UA) 5.5 5.0 - 8.0      Protein 100 (A) Negative mg/dL    Glucose >1,000 (A) Negative mg/dL    Ketone >80 (A) Negative mg/dL    Bilirubin Negative Negative      Blood Negative Negative      Urobilinogen 1.0 0.2 - 1.0 EU/dL    Nitrites Negative Negative      Leukocyte Esterase Negative Negative     URINE MICROSCOPIC    Collection Time: 02/11/22  4:10 PM   Result Value Ref Range    WBC 0-4 0 - 4 /hpf    RBC 0-5 0 - 2 /hpf    Epithelial cells Few 0 - 20 /lpf    Bacteria Negative (A) None /hpf       Radiologic Studies -   [unfilled]  CT Results  (Last 48 hours)               02/11/22 1615  CT HEAD WO CONT Final result    Impression:      No acute intracranial abnormality. Narrative:  EXAM: CT head       INDICATION: Headache. COMPARISON: 10/5/2021       TECHNIQUE: Axial CT imaging of the head was performed without intravenous   contrast. Standard multiplanar coronal and sagittal reformatted images were   obtained and are included in interpretation. One or more dose reduction techniques were used on this CT: automated exposure   control, adjustment of the mAs and/or kVp according to patient size, and   iterative reconstruction techniques. The specific techniques used on this CT   exam have been documented in the patient's electronic medical record.   Digital   Imaging and Communications in Medicine (DICOM) format image data are available   to nonaffiliated external healthcare facilities or entities on a secure, media   free, reciprocally searchable basis with patient authorization for at least a   12-month period after this study. _______________       FINDINGS:       BRAIN AND POSTERIOR FOSSA: Old right MCA territory infarct. No evidence of acute   large vessel transcortical infarct or acute parenchymal hemorrhage. No midline   shift or hydrocephalus. EXTRA-AXIAL SPACES AND MENINGES: There are no abnormal extra-axial fluid   collections. CALVARIUM: Intact. SINUSES: Clear. OTHER: None.       _______________               CXR Results  (Last 48 hours)               02/11/22 1616  XR CHEST PORT Final result    Impression:      Faint basilar interstitial infiltrates occurring in a background of mild   pulmonary hypoinflation. Narrative:  EXAM: XR CHEST PORT       CLINICAL INDICATION/HISTORY: fatigue ; covid exposure   -Additional: None       COMPARISON: 10/5/2021       TECHNIQUE: Frontal view of the chest       _______________       FINDINGS:       HEART AND MEDIASTINUM: Normal cardiac size and mediastinal contours. LUNGS AND PLEURAL SPACES: Lungs are underexpanded. Faint linear opacities are   present across the mid to lower lung zones bilaterally without evidence of   pneumothorax or pleural effusion. BONY THORAX AND SOFT TISSUES: No acute osseous abnormality       _______________                 Medical Decision Making and ED Course   - I am the first and primary provider for this patient AND AM THE PRIMARY PROVIDER OF RECORD. - I reviewed the vital signs, available nursing notes, past medical history, past surgical history, family history and social history. - Initial assessment performed. The patients presenting problems have been discussed, and the staff are in agreement with the care plan formulated and outlined with them.   I have encouraged them to ask questions as they arise throughout their visit. Vital Signs-Reviewed the patient's vital signs. Patient Vitals for the past 12 hrs:   Temp Pulse Resp BP SpO2   02/11/22 1827  69 18 139/66 99 %   02/11/22 1521     96 %   02/11/22 1516 98.7 °F (37.1 °C) 78 18 (!) 155/75 96 %       EKG interpretation: (Preliminary): Performed at 1521  Rhythm: normal sinus rhythm; and regular . Rate (approx.): 76; Axis: left axis deviation; CA interval: normal; QRS interval: normal ; ST/T wave: non-specific changes; Other findings: QTc 428. No acute ST changes. Records Reviewed: Nursing Notes    ED Course:   Possible Januvia effect; TIA; stroke; COVID exposure; electrolytes. Doubt infection; no EKG changes    5:58 PM  Reviewed labs with pt. He has a small infiltrate on CXR and was advised to get a covid test from him local pharmacy. VSS; NAD; neg lactate. To return if any worsening symptoms or concerns. Consultations:       Consultations:         Procedures and Critical Care         Disposition     Disposition:  Discharge    Remove if not discharged  DISCHARGE PLAN:  1. Current Discharge Medication List      CONTINUE these medications which have NOT CHANGED    Details   Jardiance 25 mg tablet Take 1 tablet by mouth once daily  Qty: 90 Tablet, Refills: 0      metFORMIN ER (GLUCOPHAGE XR) 500 mg tablet TAKE 2 TABLETS BY MOUTH DAILY WITH BREAKFAST AND WITH SUPPER  Qty: 120 Tablet, Refills: 0    Associated Diagnoses: Diabetes mellitus type 2, diet-controlled (HCC)      SITagliptin (Januvia) 100 mg tablet Take 0.5 Tablets by mouth daily. Qty: 90 Tablet, Refills: 1    Associated Diagnoses: Diabetes mellitus type 2, diet-controlled (HCC)      clopidogreL (PLAVIX) 75 mg tab Take 75 mg by mouth daily. atorvastatin (LIPITOR) 80 mg tablet Take 1 Tablet by mouth every other day.   Qty: 90 Tablet, Refills: 1    Associated Diagnoses: Mixed hyperlipidemia      losartan-hydroCHLOROthiazide (HYZAAR) 100-12.5 mg per tablet Take 1 Tablet by mouth daily. Qty: 90 Tablet, Refills: 1    Associated Diagnoses: Essential hypertension      glucose blood VI test strips (OneTouch Ultra Test) strip Use 1 test strip to obtain single drop of blood for daily monitoring of blood glucose  Qty: 100 Strip, Refills: 1    Associated Diagnoses: Diabetes mellitus type 2, diet-controlled (HCC)      aspirin delayed-release 81 mg tablet Take 325 mg by mouth daily. multivitamin (ONE A DAY) tablet Take 1 Tablet by mouth daily. cholecalciferol (Vitamin D3) (1000 Units /25 mcg) tablet Take  by mouth daily. 2.   Follow-up Information     Follow up With Specialties Details Why Contact Info    Chuyita Campa NP Nurse Practitioner Schedule an appointment as soon as possible for a visit in 3 days  [facility] [id number] [phone]          3. Return to ED if worse   4. Current Discharge Medication List          Diagnosis     Clinical Impression:   1. Fatigue, unspecified type    2. Contact with and (suspected) exposure to covid-19        Attestations:    Kendall Lucia MD    Please note that this dictation was completed with [employer] Elite, the [employer] voice recognition software. Quite often unanticipated grammatical, syntax, homophones, and other interpretive errors are inadvertently transcribed by the computer software. Please disregard these errors. Please excuse any errors that have escaped final proofreading. Thank you.

## 2022-02-11 NOTE — TELEPHONE ENCOUNTER
Triage limited as pt not present during triage. Received call from [address] at [facility] with Red Flag Complaint. Subjective: Last 3-4 days he has been dizzy, lethargic, doesn't want to do anything, can't walk good, stumbles, doesn't eat good, I have been trying to get him to drink water. A little confusion off and on. Mostly first thing in the morning. Current Symptoms: fatigue, lethargy, poor appetite with nausea at times, not walking well, dizzy when walking and holds on to things. Onset: 4 days ago; worsening    Associated Symptoms: reduced activity, reduced appetite    Pain Severity: denies pain; n/a; n/a    Temperature: denies fever n/a    What has been tried: nothing. But was started on Jardiance on 1/18 and wife is concerned that he may be having side effects from this, however he hasn't taken any meds in last 3 days. LMP: NA Pregnant: NA    Recommended disposition:     Care advice provided, patient verbalizes understanding; denies any other questions or concerns; instructed to call back for any new or worsening symptoms. Patient proceeding to nearest Emergency Department States she will call back if unable to get pt to agree to go. Attention Provider: Thank you for allowing me to participate in the care of your patient. The patient was connected to triage in response to information provided to the [facility]. Please do not respond through this encounter as the response is not directed to a shared pool.       Reason for Disposition   SEVERE weakness (i.e., unable to walk or barely able to walk, requires support) and new-onset or worsening    Protocols used: WEAKNESS (GENERALIZED) AND FATIGUE-ADULT-[place]

## 2022-02-11 NOTE — ED TRIAGE NOTES
Dizziness, confused x couple weeks. Decrease appetite, last ate 1 week ago.  Cough with deep breath  Stent in right carotid artery December 2021  Denies covid vaccines  Fever at home 98,

## 2022-02-11 NOTE — TELEPHONE ENCOUNTER
----- Message from Jackie Middleton sent at 2/11/2022 12:46 PM EST -----  Subject: Medication Problem    QUESTIONS  Name of Medication? Jardiance 25 mg tablet  Patient-reported dosage and instructions? 25mg/1x day  What question or problem do you have with the medication? Moraima Lucas. pt.   wife is calling on behalf of the pt. She has stated that he is   experiencing some side effect such as some weakness, confusion, and   sickness to his stomach. Moraima Lucas would like to know the pt. should stop   taking the medication  Preferred Pharmacy? [address] [place] [address] [place], [address] [facility] phone number (if available)? [phone]  Additional Information for Provider? She would like some guidance of the   issue   ---------------------------------------------------------------------------  --------------  CALL BACK INFO  What is the best way for the office to contact you? OK to leave message on   voicemail  Preferred Call Back Phone Number? [phone]  ---------------------------------------------------------------------------  --------------  SCRIPT ANSWERS  Relationship to Patient? Other  Representative Name? Moraima Shayy  Is the Representative on the appropriate HIPAA document in Epic?  Yes

## 2022-02-14 ENCOUNTER — TELEPHONE (OUTPATIENT)
Dept: FAMILY MEDICINE CLINIC | Age: 67
End: 2022-02-14

## 2022-02-17 ENCOUNTER — VIRTUAL VISIT (OUTPATIENT)
Dept: FAMILY MEDICINE CLINIC | Age: 67
End: 2022-02-17
Payer: COMMERCIAL

## 2022-02-17 DIAGNOSIS — E66.01 SEVERE OBESITY (BMI 35.0-39.9) WITH COMORBIDITY (HCC): ICD-10-CM

## 2022-02-17 DIAGNOSIS — R53.1 WEAKNESS: ICD-10-CM

## 2022-02-17 DIAGNOSIS — E11.9 DIABETES MELLITUS TYPE 2, DIET-CONTROLLED (HCC): ICD-10-CM

## 2022-02-17 DIAGNOSIS — E11.65 TYPE 2 DIABETES MELLITUS WITH HYPERGLYCEMIA, WITHOUT LONG-TERM CURRENT USE OF INSULIN (HCC): ICD-10-CM

## 2022-02-17 DIAGNOSIS — Z74.09 DECREASED FUNCTIONAL MOBILITY AND ENDURANCE: Primary | ICD-10-CM

## 2022-02-17 PROCEDURE — 99443 PR PHYS/QHP TELEPHONE EVALUATION 21-30 MIN: CPT | Performed by: NURSE PRACTITIONER

## 2022-02-17 NOTE — PROGRESS NOTES
Leobardo Chinchilla. is a 61 y.o. male, evaluated via audio-only technology on 2/17/2022 for No chief complaint on file. .    Assessment & Plan:   1. Decreased functional mobility and endurance. Patient will be referred to physical therapy for evaluation and treatment of decreased functional mobility and endurance. 2.  Type 2 diabetes. Continue Jardiance 25 mg tablet, Metformin  mg tablet, take 2 tablets daily with breakfast and supper; Sitagliptin 100 mg tablet daily for management of type 2 diabetes. 12  Subjective:   Patient was recently seen in ED on 2/11/2022 for dizziness and weakness. Lizzeth Etienne He was found to be dehydrated and was given fluid. Patient's wife reported with newly prescribed Jardiance 25 mg tablets there is seem to be an increase in his dizziness and weakness as well as confusion. She was advised to stop Jardiance until this visit. Patient has history of CVA which caused foods to have a terrible taste. Because of this condition, the the patient eats very little. I have advised the patient to continue to eat 3 meals per day and to supplement with Glucerna 3 times daily and up to 4 if possible. Patient reports he can tolerate Glucerna drinks. Since discontinuation of the patient's Jardiance, his blood sugars has been greater than 200s. Before discontinuation of Jardiance his blood sugars were in 130s. The patient was drinking 1 Ensure per day which may have caused some elevated blood sugars. I have advised the patient to switch to Glucerna and to reinstitute his Jardiance but at 12.5 mg daily to evaluate exacerbation of dizziness, weakness and confusion. Sure if the initial symptoms were due to dehydration or newly initiated Jardiance. Patient has become weak and has decreased endurance due to lack of caloric intake.   Will refer patient to physical therapy to increase his mobility and endurance and strength which would provide him greater ability to ambulate household distances and proved ADL performance. Prior to Admission medications    Medication Sig Start Date End Date Taking? Authorizing Provider   Jardiance 25 mg tablet Take 1 tablet by mouth once daily 2/10/22   Stephan Ortega MD   metFORMIN ER (GLUCOPHAGE XR) 500 mg tablet TAKE 2 TABLETS BY MOUTH DAILY WITH BREAKFAST AND WITH SUPPER 1/17/22   Stephan Ortega MD   SITagliptin (Januvia) 100 mg tablet Take 0.5 Tablets by mouth daily. 12/9/21   Maciel Varinder MORALES NP   clopidogreL (PLAVIX) 75 mg tab Take 75 mg by mouth daily. 11/16/21   Provider, Historical   atorvastatin (LIPITOR) 80 mg tablet Take 1 Tablet by mouth every other day. 12/9/21   Maciel Varinder MORALES NP   losartan-hydroCHLOROthiazide (HYZAAR) 100-12.5 mg per tablet Take 1 Tablet by mouth daily. 10/13/21   Maciel Gal CARMEN, NP   glucose blood VI test strips (OneTouch Ultra Test) strip Use 1 test strip to obtain single drop of blood for daily monitoring of blood glucose 10/13/21   Maciel Varinder MORALES NP   aspirin delayed-release 81 mg tablet Take 325 mg by mouth daily. Provider, Historical   multivitamin (ONE A DAY) tablet Take 1 Tablet by mouth daily. Provider, Historical   cholecalciferol (Vitamin D3) (1000 Units /25 mcg) tablet Take  by mouth daily. Provider, Hemanth Hines, who was evaluated through a patient-initiated, synchronous (real-time) audio only encounter, and/or her healthcare decision maker, is aware that it is a billable service, which includes applicable co-pays, with coverage as determined by his insurance carrier. He provided verbal consent to proceed. He has not had a related appointment within my department in the past 7 days or scheduled within the next 24 hours. The patient was located at home in a state where the provider was licensed to provide care.     On this date 02/17/2022 I have spent 22 minutes reviewing previous notes, test results and face to face (virtual) with the patient discussing the diagnosis and importance of compliance with the treatment plan as well as documenting on the day of the visit.     Ritchie Tony, NP

## 2022-03-09 ENCOUNTER — OFFICE VISIT (OUTPATIENT)
Dept: FAMILY MEDICINE CLINIC | Age: 67
End: 2022-03-09
Payer: COMMERCIAL

## 2022-03-09 VITALS
OXYGEN SATURATION: 96 % | RESPIRATION RATE: 20 BRPM | HEART RATE: 72 BPM | HEIGHT: 70 IN | WEIGHT: 271.2 LBS | DIASTOLIC BLOOD PRESSURE: 64 MMHG | TEMPERATURE: 98.1 F | BODY MASS INDEX: 38.82 KG/M2 | SYSTOLIC BLOOD PRESSURE: 108 MMHG

## 2022-03-09 DIAGNOSIS — E11.65 TYPE 2 DIABETES MELLITUS WITH HYPERGLYCEMIA, WITHOUT LONG-TERM CURRENT USE OF INSULIN (HCC): ICD-10-CM

## 2022-03-09 DIAGNOSIS — I10 ESSENTIAL HYPERTENSION: ICD-10-CM

## 2022-03-09 DIAGNOSIS — Z12.11 COLON CANCER SCREENING: Primary | ICD-10-CM

## 2022-03-09 DIAGNOSIS — E78.2 MIXED HYPERLIPIDEMIA: ICD-10-CM

## 2022-03-09 PROCEDURE — 99214 OFFICE O/P EST MOD 30 MIN: CPT | Performed by: NURSE PRACTITIONER

## 2022-03-09 NOTE — PROGRESS NOTES
History of Present Illness  Kenisha Hay is a 61 y.o. male who presents today for:    Chief Complaint   Patient presents with    Follow-up     3 month       Past Medical History  Past Medical History:   Diagnosis Date    Atherosclerosis of right carotid artery     Cerebral atherosclerosis     Hyperlipidemia     Hypertension     Non-compliance     Stroke [facility])         Surgical History  Past Surgical History:   Procedure Laterality Date    HX CAROTID STENT          Current Medications  Current Outpatient Medications   Medication Sig    SITagliptin (Januvia) 100 mg tablet Take 1 Tablet by mouth daily.  Jardiance 25 mg tablet Take 1 tablet by mouth once daily    metFORMIN ER (GLUCOPHAGE XR) 500 mg tablet TAKE 2 TABLETS BY MOUTH DAILY WITH BREAKFAST AND WITH SUPPER    clopidogreL (PLAVIX) 75 mg tab Take 75 mg by mouth daily.  atorvastatin (LIPITOR) 80 mg tablet Take 1 Tablet by mouth every other day.  losartan-hydroCHLOROthiazide (HYZAAR) 100-12.5 mg per tablet Take 1 Tablet by mouth daily.  glucose blood VI test strips (OneTouch Ultra Test) strip Use 1 test strip to obtain single drop of blood for daily monitoring of blood glucose    aspirin delayed-release 81 mg tablet Take 325 mg by mouth daily.  multivitamin (ONE A DAY) tablet Take 1 Tablet by mouth daily.  cholecalciferol (Vitamin D3) (1000 Units /25 mcg) tablet Take  by mouth daily. No current facility-administered medications for this visit.        Allergies/Drug Reactions  No Known Allergies     Family History  Family History   Problem Relation Age of Onset    OSTEOARTHRITIS Mother     Heart Disease Father     Heart Disease Brother         Social History  Social History     Tobacco Use    Smoking status: Former Smoker     Packs/day: 1.00     Years: 35.00     Pack years: 35.00     Quit date: 2020     Years since quittin.1    Smokeless tobacco: Never Used   Substance Use Topics    Alcohol use: Never  Drug use: Never        Health Maintenance   Topic Date Due    COVID-19 Vaccine (1) Never done    Pneumococcal 0-64 years (1 of 2 - PPSV23) Never done    Eye Exam Retinal or Dilated  Never done    DTaP/Tdap/Td series (1 - Tdap) Never done    Colorectal Cancer Screening Combo  Never done    Shingrix Vaccine Age 50> (1 of 2) Never done    Low dose CT lung screening  Never done    Lipid Screen  01/12/2021    A1C test (Diabetic or Prediabetic)  01/29/2021    Foot Exam Q1  10/29/2021    MICROALBUMIN Q1  10/29/2021    Depression Screen  01/17/2023    Flu Vaccine  Completed    Hepatitis C Screening  Discontinued     Immunization History   Administered Date(s) Administered    Influenza Vaccine (Quadrivalent)(>18 Yrs Flublok 88332) 10/29/2020    Influenza, Quadrivalent, Adjuvanted (>65 Yrs FLUAD QUAD 27791) 11/11/2021     Physical Exam  Vital signs:   Vitals:    03/09/22 1317   BP: 108/64   Pulse: 72   Resp: 20   Temp: 98.1 °F (36.7 °C)   SpO2: 96%   Weight: 271 lb 3.2 oz (123 kg)   Height: 5' 10\" (1.778 m)     General: alert, oriented, not in distress  Head: scalp normal, atraumatic  Eyes: pupils are equal and reactive, full and intact EOM's  Ears: patent ear canal, intact tympanic membrane  Nose: normal turbinates, no congestion or discharge  Lips/Mouth: moist lips and buccal mucosa, non-enlarged tonsils, pink throat  Neck: supple, no JVD, no lymphadenopathy, non-palpable thyroid  Chest/Lungs: clear breath sounds, no wheezing or crackles  Heart: normal rate, regular rhythm, no murmur  Abdomen: soft, non-distended, non-tender, normal bowel sounds, no organomegaly, no masses  Extremities: no focal deformities, no edema  Skin: no active skin lesions    Laboratory/Tests:  Admission on 02/11/2022, Discharged on 02/11/2022   Component Date Value Ref Range Status    Ventricular Rate 02/11/2022 76  BPM Final    Atrial Rate 02/11/2022 76  BPM Final    P-R Interval 02/11/2022 146  ms Final    QRS Duration 02/11/2022 108  ms Final    Q-T Interval 02/11/2022 380  ms Final    QTC Calculation (Bezet) 02/11/2022 428  ms Final    Calculated P Axis 02/11/2022 17  degrees Final    Calculated R Axis 02/11/2022 -21  degrees Final    Calculated T Axis 02/11/2022 -5  degrees Final    Diagnosis 02/11/2022    Final                    Value:Sinus rhythm  Borderline left axis deviation  Borderline T abnormalities, inferior leads    Confirmed by SILVANO UMANA ([id number]) on 2/11/2022 8:30:06 PM      WBC 02/11/2022 8.3  4.6 - 13.2 K/uL Final    RBC 02/11/2022 5.16  4.35 - 5.65 M/uL Final    HGB 02/11/2022 15.4  13.0 - 16.0 g/dL Final    HCT 02/11/2022 46.2  36.0 - 48.0 % Final    MCV 02/11/2022 89.5  78.0 - 100.0 FL Final    MCH 02/11/2022 29.8  24.0 - 34.0 PG Final    MCHC 02/11/2022 33.3  31.0 - 37.0 g/dL Final    RDW 02/11/2022 12.2  11.6 - 14.5 % Final    PLATELET 59/00/0670 301  135 - 420 K/uL Final    MPV 02/11/2022 10.6  9.2 - 11.8 FL Final    NRBC 02/11/2022 0.0  0.0  WBC Final    ABSOLUTE NRBC 02/11/2022 0.00  0.00 - 0.01 K/uL Final    NEUTROPHILS 02/11/2022 59  40 - 73 % Final    LYMPHOCYTES 02/11/2022 25  21 - 52 % Final    MONOCYTES 02/11/2022 15* 3 - 10 % Final    EOSINOPHILS 02/11/2022 0  0 - 5 % Final    BASOPHILS 02/11/2022 0  0 - 2 % Final    IMMATURE GRANULOCYTES 02/11/2022 1* 0 - 0.5 % Final    ABS. NEUTROPHILS 02/11/2022 4.9  1.8 - 8.0 K/UL Final    ABS. LYMPHOCYTES 02/11/2022 2.1  0.9 - 3.6 K/UL Final    ABS. MONOCYTES 02/11/2022 1.2  0.05 - 1.2 K/UL Final    ABS. EOSINOPHILS 02/11/2022 0.0  0.0 - 0.4 K/UL Final    ABS. BASOPHILS 02/11/2022 0.0  0.0 - 0.1 K/UL Final    ABS. IMM.  GRANS. 02/11/2022 0.1* 0.00 - 0.04 K/UL Final    DF 02/11/2022 AUTOMATED    Final    Sodium 02/11/2022 131* 135 - 145 mmol/L Final    Potassium 02/11/2022 3.7  3.2 - 5.1 mmol/L Final    Chloride 02/11/2022 94  94 - 111 mmol/L Final    CO2 02/11/2022 23  21 - 33 mmol/L Final    Anion gap 02/11/2022 14 mmol/L Final    Glucose 02/11/2022 132* 70 - 110 mg/dL Final    BUN 02/11/2022 13  9 - 21 mg/dL Final    Creatinine 02/11/2022 0.70* 0.8 - 1.50 mg/dL Final    BUN/Creatinine ratio 02/11/2022 19    Final    GFR est AA 02/11/2022 >60  ml/min/1.73m2 Final    GFR est non-AA 02/11/2022 >60  ml/min/1.73m2 Final    Comment: Estimated GFR is calculated using the IDMS-traceable Modification of Diet in Renal Disease (MDRD) Study equation, reported for both  Americans (GFRAA) and non- Americans (GFRNA), and normalized to 1.73m2 body surface area. The physician must decide which value applies to the patient. The MDRD study equation should only be used in individuals age 25 or older. It has not been validated for the following: pregnant women, patients with serious comorbid conditions, or on certain medications, or persons with extremes of body size, muscle mass, or nutritional status.  Calcium 02/11/2022 8.9  8.5 - 10.5 mg/dL Final    Troponin-I, Qt. 02/11/2022 <0.02* 0.02 - 0.05 ng/mL Final    Protein, total 02/11/2022 8.1  6.1 - 8.4 g/dL Final    Albumin 02/11/2022 3.8  3.5 - 4.7 g/dL Final    Globulin 02/11/2022 4.3  g/dL Final    A-G Ratio 02/11/2022 0.9    Final    Bilirubin, total 02/11/2022 1.0  0.2 - 1.0 mg/dL Final    Bilirubin, direct 02/11/2022 0.2  0.0 - 0.3 mg/dL Final    Alk.  phosphatase 02/11/2022 54  38 - 126 U/L Final    AST (SGOT) 02/11/2022 26  17 - 74 U/L Final    ALT (SGPT) 02/11/2022 19  3 - 72 U/L Final    Lactic acid 02/11/2022 1.4  0.5 - 2.0 mmol/L Final    Magnesium 02/11/2022 2.2  1.7 - 2.8 mg/dL Final    Color 02/11/2022 Yellow    Final    Color Reference Range: Straw, Yellow or Dark Yellow    Appearance 02/11/2022 Clear    Final    Specific gravity 02/11/2022 >1.030* 1.005 - 1.030 Final    pH (UA) 02/11/2022 5.5  5.0 - 8.0   Final    Protein 02/11/2022 100* Negative mg/dL Final    Glucose 02/11/2022 >1,000* Negative mg/dL Final    Ketone 02/11/2022 >80* Negative mg/dL Final    Bilirubin 02/11/2022 Negative  Negative   Final    Blood 02/11/2022 Negative  Negative   Final    Urobilinogen 02/11/2022 1.0  0.2 - 1.0 EU/dL Final    Nitrites 02/11/2022 Negative  Negative   Final    Leukocyte Esterase 02/11/2022 Negative  Negative   Final    WBC 02/11/2022 0-4  0 - 4 /hpf Final    RBC 02/11/2022 0-5  0 - 2 /hpf Final    Epithelial cells 02/11/2022 Few  0 - 20 /lpf Final    Epithelial cell category consists of squamous cells and/or transitional urothelial cells. Renal tubular cells, if present, are separately identified as such.  Bacteria 02/11/2022 Negative* None /hpf Final     Patient reports no further episodes of dizziness. Patient reports he is currently attending physical therapy. He has experienced increase in steadiness, balance and endurance. Patient reports his blood glucose levels at home have been elevated with average readings of 170s. He reports he is taking his medication as prescribed. At last virtual visit, I advised patient to decrease Jardiance to 1/2 tablet daily. This was done due to observe if medication was possible cause of dizziness. I have advised the patient to resume Jardiance 25 mg, 1 tablet daily at this time. Patient is due for routine colorectal cancer screening and ordered occult blood immunoassay on testing at this time. Assessment/Plan:    1. Type 2 diabetes. Continue Jardiance 25 mg tablet daily, Januvia 100 mg tablet daily and Metformin 500 mg tablet, take 2 tablets daily for management of type 2 diabetes. 2.  Colon cancer screening. Patient was given occult blood immunoassay testing kit for colon cancer screening. 3.  Essential hypertension. Continue Losartan/HCTZ 100/12.5 mg tablet daily for management of essential hypertension. 4.  Mixed hyperlipidemia. Continue Atorvastatin 80 mg tablet every other day for management of mixed hyperlipidemia. 5.  Cerebral atherosclerosis.   Continue Clopidogrel 75 mg tablet daily for management of cerebral atherosclerosis. I have discussed the diagnosis with the patient and the intended plan as seen in the above orders. The patient has received an after-visit summary and questions were answered concerning future plans. I have discussed medication side effects and warnings with the patient as well. I have reviewed the plan of care with the patient, accepted their input and they are in agreement with the treatment goals.        Ford Rene NP  March 9, 2022

## 2022-03-09 NOTE — PROGRESS NOTES
Take home stool sample kit given to patient and will mail to Radha Rushing. presents today for   Chief Complaint   Patient presents with    Follow-up     3 month       Is someone accompanying this pt? Yes, wife  Is the patient using any DME equipment during [address]? no    Depression Screening:  3 most recent PHQ Screens 3/9/2022   Little interest or pleasure in doing things Not at all   Feeling down, depressed, irritable, or hopeless Not at all   Total Score PHQ 2 0       Learning Assessment:  Learning Assessment 10/29/2020   PRIMARY LEARNER Patient   HIGHEST LEVEL OF EDUCATION - PRIMARY LEARNER  -   BARRIERS PRIMARY LEARNER -   CO-LEARNER CAREGIVER -   PRIMARY LANGUAGE ENGLISH   LEARNER PREFERENCE PRIMARY DEMONSTRATION   ANSWERED BY patient   RELATIONSHIP SELF       Fall Risk  No flowsheet data found. ADL  ADL Assessment 3/9/2022   Feeding yourself No Help Needed   Getting from bed to chair No Help Needed   Getting dressed No Help Needed   Bathing or showering No Help Needed   Walk across the room (includes cane/walker) No Help Needed   Using the telphone No Help Needed   Taking your medications No Help Needed   Preparing meals No Help Needed   Managing money (expenses/bills) No Help Needed   Moderately strenuous housework (laundry) No Help Needed   Shopping for personal items (toiletries/medicines) No Help Needed   Shopping for groceries No Help Needed   Driving No Help Needed   Climbing a flight of stairs No Help Needed   Getting to places beyond walking distances No Help Needed       Health Maintenance reviewed and discussed and ordered per Provider.     Health Maintenance Due   Topic Date Due    COVID-19 Vaccine (1) Never done    Pneumococcal 0-64 years (1 of 2 - PPSV23) Never done    Eye Exam Retinal or Dilated  Never done    DTaP/Tdap/Td series (1 - Tdap) Never done    Colorectal Cancer Screening Combo  Never done    Shingrix Vaccine Age 50> (1 of 2) Never done    Low dose CT lung screening  Never done    Lipid Screen  01/12/2021    A1C test (Diabetic or Prediabetic)  01/29/2021    Foot Exam Q1  10/29/2021    MICROALBUMIN Q1  10/29/2021   . Coordination of Care:  1. \"Have you been to the ER, urgent care clinic since your last visit? Hospitalized since your last visit? \" No    2. \"Have you seen or consulted any other health care providers outside of the [address] [place] [address] since your last visit? \" No     3. For patients aged 39-70: Has the patient had a colonoscopy? No stool sample kit given to patient to take home    If the patient is female:    4. For patients aged 41-77: Has the patient had a mammogram within the past 2 years? NA - based on age    11. For patients aged 21-65: Has the patient had a pap smear?  NA - based on age

## 2022-03-19 PROBLEM — E11.9 DIABETES MELLITUS TYPE 2, DIET-CONTROLLED (HCC): Status: ACTIVE | Noted: 2020-08-31

## 2022-06-01 DIAGNOSIS — E11.9 DIABETES MELLITUS TYPE 2, DIET-CONTROLLED (HCC): ICD-10-CM

## 2022-06-02 DIAGNOSIS — E11.9 DIABETES MELLITUS TYPE 2, DIET-CONTROLLED (HCC): ICD-10-CM

## 2022-06-02 RX ORDER — METFORMIN HYDROCHLORIDE 500 MG/1
TABLET, EXTENDED RELEASE ORAL
Qty: 120 TABLET | Refills: 0 | Status: SHIPPED | OUTPATIENT
Start: 2022-06-02 | End: 2022-06-02

## 2022-06-02 RX ORDER — METFORMIN HYDROCHLORIDE 500 MG/1
TABLET, EXTENDED RELEASE ORAL
Qty: 120 TABLET | Refills: 0 | Status: SHIPPED | OUTPATIENT
Start: 2022-06-02

## 2022-06-02 NOTE — TELEPHONE ENCOUNTER
[facility] calls for clarification on the directions for the metformin. I have looked at several notes (including Dr. Kramer Right notes) and they all say take two tablets by by mouth with breakfast and supper. I called the patient's wife to clarify how he is taking his medication. She says that he takes 2 tablets in the morning and that is all. Please cancel the first script and resend this one.

## 2022-06-03 NOTE — TELEPHONE ENCOUNTER
For Tutu Guerrero in place:    Recommendation Provided To:    Intervention Detail: New Rx: 1, reason: Patient Preference   Gap Closed?:    Intervention Accepted By:   Deep Tsai Time Spent (min): 5

## 2022-07-06 RX ORDER — EMPAGLIFLOZIN 25 MG/1
TABLET, FILM COATED ORAL
Qty: 90 TABLET | Refills: 0 | Status: SHIPPED | OUTPATIENT
Start: 2022-07-06 | End: 2022-07-06

## 2022-07-06 RX ORDER — EMPAGLIFLOZIN 25 MG/1
TABLET, FILM COATED ORAL
Qty: 90 TABLET | Refills: 0 | Status: SHIPPED | OUTPATIENT
Start: 2022-07-06

## 2022-07-20 ENCOUNTER — OFFICE VISIT (OUTPATIENT)
Dept: FAMILY MEDICINE CLINIC | Age: 67
End: 2022-07-20
Payer: COMMERCIAL

## 2022-07-20 VITALS
WEIGHT: 267.4 LBS | HEART RATE: 78 BPM | TEMPERATURE: 98.6 F | RESPIRATION RATE: 18 BRPM | BODY MASS INDEX: 38.28 KG/M2 | SYSTOLIC BLOOD PRESSURE: 121 MMHG | HEIGHT: 70 IN | OXYGEN SATURATION: 96 % | DIASTOLIC BLOOD PRESSURE: 72 MMHG

## 2022-07-20 DIAGNOSIS — I10 ESSENTIAL HYPERTENSION: ICD-10-CM

## 2022-07-20 DIAGNOSIS — E78.2 MIXED HYPERLIPIDEMIA: ICD-10-CM

## 2022-07-20 DIAGNOSIS — E11.65 TYPE 2 DIABETES MELLITUS WITH HYPERGLYCEMIA, WITHOUT LONG-TERM CURRENT USE OF INSULIN (HCC): ICD-10-CM

## 2022-07-20 DIAGNOSIS — R68.89 OTHER GENERAL SYMPTOMS AND SIGNS: ICD-10-CM

## 2022-07-20 DIAGNOSIS — Z12.5 SCREENING PSA (PROSTATE SPECIFIC ANTIGEN): ICD-10-CM

## 2022-07-20 DIAGNOSIS — E55.9 VITAMIN D DEFICIENCY: ICD-10-CM

## 2022-07-20 DIAGNOSIS — E11.9 DIABETES MELLITUS TYPE 2, DIET-CONTROLLED (HCC): Primary | ICD-10-CM

## 2022-07-20 PROCEDURE — 99214 OFFICE O/P EST MOD 30 MIN: CPT | Performed by: NURSE PRACTITIONER

## 2022-07-20 NOTE — PROGRESS NOTES
Patient reports he is not taking his blood sugar as regularly as he should and would like to talk about getting a device like dexcom or jim. Benjy Clark. presents today for   Chief Complaint   Patient presents with    Follow-up     3m follow up       Is someone accompanying this pt? Wife     Is the patient using any DME equipment during [address]? no    Depression Screening:  3 most recent PHQ Screens 7/20/2022   Little interest or pleasure in doing things Not at all   Feeling down, depressed, irritable, or hopeless Not at all   Total Score PHQ 2 0       Learning Assessment:  Learning Assessment 10/29/2020   PRIMARY LEARNER Patient   HIGHEST LEVEL OF EDUCATION - PRIMARY LEARNER  -   BARRIERS PRIMARY LEARNER -   CO-LEARNER CAREGIVER -   PRIMARY LANGUAGE ENGLISH   LEARNER PREFERENCE PRIMARY DEMONSTRATION   ANSWERED BY patient   RELATIONSHIP SELF       Fall Risk  No flowsheet data found. ADL  ADL Assessment 7/20/2022   Feeding yourself No Help Needed   Getting from bed to chair No Help Needed   Getting dressed No Help Needed   Bathing or showering No Help Needed   Walk across the room (includes cane/walker) No Help Needed   Using the telphone No Help Needed   Taking your medications No Help Needed   Preparing meals No Help Needed   Managing money (expenses/bills) No Help Needed   Moderately strenuous housework (laundry) No Help Needed   Shopping for personal items (toiletries/medicines) No Help Needed   Shopping for groceries No Help Needed   Driving No Help Needed   Climbing a flight of stairs No Help Needed   Getting to places beyond walking distances No Help Needed       Health Maintenance reviewed and discussed and ordered per Provider.     Health Maintenance Due   Topic Date Due    COVID-19 Vaccine (1) Never done    Pneumococcal 0-64 years (1 - PCV) Never done    Eye Exam Retinal or Dilated  Never done    DTaP/Tdap/Td series (1 - Tdap) Never done    Colorectal Cancer Screening Combo  Never done Shingrix Vaccine Age 50> (1 of 2) Never done    Low dose CT lung screening  Never done    Lipid Screen  01/12/2021    A1C test (Diabetic or Prediabetic)  01/29/2021    Foot Exam Q1  10/29/2021    MICROALBUMIN Q1  10/29/2021   . Coordination of Care:  1. \"Have you been to the ER, urgent care clinic since your last visit? Hospitalized since your last visit? \" No    2. \"Have you seen or consulted any other health care providers outside of the [address] [place] [address] since your last visit? \" No     3. For patients aged 39-70: Has the patient had a colonoscopy? Yes - no Care Gap present     If the patient is female:    4. For patients aged 41-77: Has the patient had a mammogram within the past 2 years? NA - based on age/sex    5. For patients aged 21-65: Has the patient had a pap smear?  NA - based on age /sex

## 2022-07-20 NOTE — PROGRESS NOTES
History of Present Illness  Mian Camejo is a 61 y.o. male who presents today for:    Chief Complaint   Patient presents with    Follow-up     3m follow up     Past Medical History  Past Medical History:   Diagnosis Date    Atherosclerosis of right carotid artery     Cerebral atherosclerosis     Hyperlipidemia     Hypertension     Non-compliance     Stroke [facility])         Surgical History  Past Surgical History:   Procedure Laterality Date    HX CAROTID STENT          Current Medications  Current Outpatient Medications   Medication Sig    Jardiance 25 mg tablet Take 1 tablet by mouth once daily    metFORMIN ER (GLUCOPHAGE XR) 500 mg tablet TAKE 2 TABLETS BY MOUTH ONCE DAILY WITH BREAKFAST    SITagliptin (Januvia) 100 mg tablet Take 1 Tablet by mouth daily. clopidogreL (PLAVIX) 75 mg tab Take 75 mg by mouth daily. atorvastatin (LIPITOR) 80 mg tablet Take 1 Tablet by mouth every other day. losartan-hydroCHLOROthiazide (HYZAAR) 100-12.5 mg per tablet Take 1 Tablet by mouth daily. glucose blood VI test strips (OneTouch Ultra Test) strip Use 1 test strip to obtain single drop of blood for daily monitoring of blood glucose    aspirin delayed-release 81 mg tablet Take 325 mg by mouth daily. multivitamin (ONE A DAY) tablet Take 1 Tablet by mouth daily. cholecalciferol (VITAMIN D3) (1000 Units /25 mcg) tablet Take  by mouth daily. No current facility-administered medications for this visit.        Allergies/Drug Reactions  No Known Allergies     Family History  Family History   Problem Relation Age of Onset    OSTEOARTHRITIS Mother     Heart Disease Father     Heart Disease Brother         Social History  Social History     Tobacco Use    Smoking status: Former     Packs/day: 1.00     Years: 35.00     Pack years: 35.00     Types: Cigarettes     Quit date: 2020     Years since quittin.4    Smokeless tobacco: Never   Substance Use Topics    Alcohol use: Never    Drug use: Never Health Maintenance   Topic Date Due    COVID-19 Vaccine (1) Never done    Pneumococcal 0-64 years (1 - PCV) Never done    Eye Exam Retinal or Dilated  Never done    DTaP/Tdap/Td series (1 - Tdap) Never done    Colorectal Cancer Screening Combo  Never done    Shingrix Vaccine Age 50> (1 of 2) Never done    Low dose CT lung screening  Never done    Lipid Screen  01/12/2021    A1C test (Diabetic or Prediabetic)  01/29/2021    Foot Exam Q1  10/29/2021    MICROALBUMIN Q1  10/29/2021    Flu Vaccine (1) 09/01/2022    Depression Screen  03/09/2023    Hepatitis C Screening  Discontinued     Immunization History   Administered Date(s) Administered    Influenza Vaccine (Quadrivalent)(>18 Yrs Flublok 39439) 10/29/2020    Influenza, Quadrivalent, Adjuvanted (>65 Yrs FLUAD QUAD [id number]) 11/11/2021     Physical Exam  Vital signs:   Vitals:    07/20/22 1312   BP: 121/72   Pulse: 78   Resp: 18   Temp: 98.6 °F (37 °C)   TempSrc: Temporal   SpO2: 96%   Weight: 267 lb 6.4 oz (121.3 kg)   Height: 5' 10\" (1.778 m)     Laboratory/Tests:  No visits with results within 3 Month(s) from this visit.    Latest known visit with results is:   Admission on 02/11/2022, Discharged on 02/11/2022   Component Date Value Ref Range Status    Ventricular Rate 02/11/2022 76  BPM Final    Atrial Rate 02/11/2022 76  BPM Final    P-R Interval 02/11/2022 146  ms Final    QRS Duration 02/11/2022 108  ms Final    Q-T Interval 02/11/2022 380  ms Final    QTC Calculation (Bezet) 02/11/2022 428  ms Final    Calculated P Axis 02/11/2022 17  degrees Final    Calculated R Axis 02/11/2022 -21  degrees Final    Calculated T Axis 02/11/2022 -5  degrees Final    Diagnosis 02/11/2022    Final                    Value:Sinus rhythm  Borderline left axis deviation  Borderline T abnormalities, inferior leads    Confirmed by SILVANO UMANA ([id number]) on 2/11/2022 8:30:06 PM      WBC 02/11/2022 8.3  4.6 - 13.2 K/uL Final    RBC 02/11/2022 5.16  4.35 - 5.65 M/uL Final    HGB 02/11/2022 15.4  13.0 - 16.0 g/dL Final    HCT 02/11/2022 46.2  36.0 - 48.0 % Final    MCV 02/11/2022 89.5  78.0 - 100.0 FL Final    MCH 02/11/2022 29.8  24.0 - 34.0 PG Final    MCHC 02/11/2022 33.3  31.0 - 37.0 g/dL Final    RDW 02/11/2022 12.2  11.6 - 14.5 % Final    PLATELET 15/24/5784 441  135 - 420 K/uL Final    MPV 02/11/2022 10.6  9.2 - 11.8 FL Final    NRBC 02/11/2022 0.0  0.0  WBC Final    ABSOLUTE NRBC 02/11/2022 0.00  0.00 - 0.01 K/uL Final    NEUTROPHILS 02/11/2022 59  40 - 73 % Final    LYMPHOCYTES 02/11/2022 25  21 - 52 % Final    MONOCYTES 02/11/2022 15 (A) 3 - 10 % Final    EOSINOPHILS 02/11/2022 0  0 - 5 % Final    BASOPHILS 02/11/2022 0  0 - 2 % Final    IMMATURE GRANULOCYTES 02/11/2022 1 (A) 0 - 0.5 % Final    ABS. NEUTROPHILS 02/11/2022 4.9  1.8 - 8.0 K/UL Final    ABS. LYMPHOCYTES 02/11/2022 2.1  0.9 - 3.6 K/UL Final    ABS. MONOCYTES 02/11/2022 1.2  0.05 - 1.2 K/UL Final    ABS. EOSINOPHILS 02/11/2022 0.0  0.0 - 0.4 K/UL Final    ABS. BASOPHILS 02/11/2022 0.0  0.0 - 0.1 K/UL Final    ABS. IMM. GRANS. 02/11/2022 0.1 (A) 0.00 - 0.04 K/UL Final    DF 02/11/2022 AUTOMATED    Final    Sodium 02/11/2022 131 (A) 135 - 145 mmol/L Final    Potassium 02/11/2022 3.7  3.2 - 5.1 mmol/L Final    Chloride 02/11/2022 94  94 - 111 mmol/L Final    CO2 02/11/2022 23  21 - 33 mmol/L Final    Anion gap 02/11/2022 14  mmol/L Final    Glucose 02/11/2022 132 (A) 70 - 110 mg/dL Final    BUN 02/11/2022 13  9 - 21 mg/dL Final    Creatinine 02/11/2022 0.70 (A) 0.8 - 1.50 mg/dL Final    BUN/Creatinine ratio 02/11/2022 19    Final    GFR est AA 02/11/2022 >60  ml/min/1.73m2 Final    GFR est non-AA 02/11/2022 >60  ml/min/1.73m2 Final    Comment: Estimated GFR is calculated using the IDMS-traceable Modification of Diet in Renal Disease (MDRD) Study equation, reported for both  Americans (GFRAA) and non- Americans (GFRNA), and normalized to 1.73m2 body surface area.  The physician must decide which value applies to the patient. The MDRD study equation should only be used in individuals age 25 or older. It has not been validated for the following: pregnant women, patients with serious comorbid conditions, or on certain medications, or persons with extremes of body size, muscle mass, or nutritional status. Calcium 02/11/2022 8.9  8.5 - 10.5 mg/dL Final    Troponin-I, Qt. 02/11/2022 <0.02 (A) 0.02 - 0.05 ng/mL Final    Protein, total 02/11/2022 8.1  6.1 - 8.4 g/dL Final    Albumin 02/11/2022 3.8  3.5 - 4.7 g/dL Final    Globulin 02/11/2022 4.3  g/dL Final    A-G Ratio 02/11/2022 0.9    Final    Bilirubin, total 02/11/2022 1.0  0.2 - 1.0 mg/dL Final    Bilirubin, direct 02/11/2022 0.2  0.0 - 0.3 mg/dL Final    Alk. phosphatase 02/11/2022 54  38 - 126 U/L Final    AST (SGOT) 02/11/2022 26  17 - 74 U/L Final    ALT (SGPT) 02/11/2022 19  3 - 72 U/L Final    Lactic acid 02/11/2022 1.4  0.5 - 2.0 mmol/L Final    Magnesium 02/11/2022 2.2  1.7 - 2.8 mg/dL Final    Color 02/11/2022 Yellow    Final    Color Reference Range: Straw, Yellow or Dark Yellow    Appearance 02/11/2022 Clear    Final    Specific gravity 02/11/2022 >1.030 (A) 1.005 - 1.030 Final    pH (UA) 02/11/2022 5.5  5.0 - 8.0   Final    Protein 02/11/2022 100 (A) Negative mg/dL Final    Glucose 02/11/2022 >1,000 (A) Negative mg/dL Final    Ketone 02/11/2022 >80 (A) Negative mg/dL Final    Bilirubin 02/11/2022 Negative  Negative   Final    Blood 02/11/2022 Negative  Negative   Final    Urobilinogen 02/11/2022 1.0  0.2 - 1.0 EU/dL Final    Nitrites 02/11/2022 Negative  Negative   Final    Leukocyte Esterase 02/11/2022 Negative  Negative   Final    WBC 02/11/2022 0-4  0 - 4 /hpf Final    RBC 02/11/2022 0-5  0 - 2 /hpf Final    Epithelial cells 02/11/2022 Few  0 - 20 /lpf Final    Epithelial cell category consists of squamous cells and/or transitional urothelial cells. Renal tubular cells, if present, are separately identified as such.     Bacteria 02/11/2022 Negative (A) None /hpf Final     Patient will have carotid ultrasound on 7/21/2022 as ordered by vascular surgeon. Endarterectomy of right carotid was performed in past.    Assessment/Plan:    1. Type 2 diabetes. Continue Jardiance 25 mg tablet daily, Januvia 100 mg tablet daily and Metformin 500 mg tablet, take 2 tablets daily for management of type 2 diabetes. 2.  Essential hypertension. Continue Losartan/HCTZ 100/12.5 mg tablet daily for management of essential hypertension. 3.  Mixed hyperlipidemia. Continue Atorvastatin 80 mg tablet every other day for management of mixed hyperlipidemia. 4.  Cerebral atherosclerosis. Continue Clopidogrel 75 mg tablet daily for management of cerebral atherosclerosis. I have discussed the diagnosis with the patient and the intended plan as seen in the above orders. The patient has received an after-visit summary and questions were answered concerning future plans. I have discussed medication side effects and warnings with the patient as well. I have reviewed the plan of care with the patient, accepted their input and they are in agreement with the treatment goals.        Mami Lange, [place]  July 20, 2022

## 2022-10-28 ENCOUNTER — CLINICAL SUPPORT (OUTPATIENT)
Dept: FAMILY MEDICINE CLINIC | Age: 67
End: 2022-10-28
Payer: COMMERCIAL

## 2022-10-28 DIAGNOSIS — Z23 ENCOUNTER FOR IMMUNIZATION: Primary | ICD-10-CM

## 2022-10-28 PROCEDURE — 90686 IIV4 VACC NO PRSV 0.5 ML IM: CPT | Performed by: NURSE PRACTITIONER

## 2022-10-28 PROCEDURE — 90471 IMMUNIZATION ADMIN: CPT | Performed by: NURSE PRACTITIONER

## 2022-10-28 NOTE — PROGRESS NOTES
Rachel Cjea. is a 61 y.o. male who presents for routine immunizations. He denies any symptoms , reactions or allergies that would exclude them from being immunized today. Risks and adverse reactions were discussed and the VIS was given to them. All questions were addressed. He refused to stay in the office for observation, was in no distress when they left.

## 2022-11-29 ENCOUNTER — HOSPITAL ENCOUNTER (EMERGENCY)
Age: 67
Discharge: HOME | End: 2022-11-29
Attending: EMERGENCY MEDICINE
Payer: COMMERCIAL

## 2022-11-29 ENCOUNTER — APPOINTMENT (OUTPATIENT)
Dept: GENERAL RADIOLOGY | Age: 67
End: 2022-11-29
Attending: EMERGENCY MEDICINE
Payer: COMMERCIAL

## 2022-11-29 VITALS
SYSTOLIC BLOOD PRESSURE: 148 MMHG | HEART RATE: 58 BPM | DIASTOLIC BLOOD PRESSURE: 78 MMHG | WEIGHT: 284 LBS | TEMPERATURE: 97.6 F | BODY MASS INDEX: 39.76 KG/M2 | HEIGHT: 71 IN | RESPIRATION RATE: 16 BRPM | OXYGEN SATURATION: 98 %

## 2022-11-29 DIAGNOSIS — L03.115 CELLULITIS OF RIGHT LOWER EXTREMITY: ICD-10-CM

## 2022-11-29 DIAGNOSIS — S80.11XA CONTUSION OF RIGHT LOWER EXTREMITY, INITIAL ENCOUNTER: Primary | ICD-10-CM

## 2022-11-29 PROCEDURE — 99283 EMERGENCY DEPT VISIT LOW MDM: CPT

## 2022-11-29 PROCEDURE — 73590 X-RAY EXAM OF LOWER LEG: CPT

## 2022-11-29 PROCEDURE — 74011250637 HC RX REV CODE- 250/637: Performed by: EMERGENCY MEDICINE

## 2022-11-29 RX ORDER — DOXYCYCLINE HYCLATE 100 MG
100 TABLET ORAL
Status: COMPLETED | OUTPATIENT
Start: 2022-11-29 | End: 2022-11-29

## 2022-11-29 RX ORDER — DOXYCYCLINE 100 MG/1
100 CAPSULE ORAL 2 TIMES DAILY
Qty: 14 CAPSULE | Refills: 0 | Status: SHIPPED | OUTPATIENT
Start: 2022-11-29 | End: 2022-12-06

## 2022-11-29 RX ADMIN — DOXYCYCLINE HYCLATE 100 MG: 100 TABLET, COATED ORAL at 15:33

## 2022-11-29 NOTE — ED TRIAGE NOTES
Right lower leg bumped with a piece of wood 1 week ago. Right foot with bruising noted Saturday night, unknown injury that could cause the right foot to have bruising with swelling.  Right shin noted to have red quarter size raised area, no open or drainage from right shin

## 2022-12-01 ENCOUNTER — OFFICE VISIT (OUTPATIENT)
Dept: FAMILY MEDICINE CLINIC | Age: 67
End: 2022-12-01
Payer: COMMERCIAL

## 2022-12-01 VITALS
HEART RATE: 72 BPM | RESPIRATION RATE: 18 BRPM | BODY MASS INDEX: 38.78 KG/M2 | OXYGEN SATURATION: 92 % | SYSTOLIC BLOOD PRESSURE: 119 MMHG | DIASTOLIC BLOOD PRESSURE: 71 MMHG | TEMPERATURE: 98.2 F | HEIGHT: 71 IN | WEIGHT: 277 LBS

## 2022-12-01 DIAGNOSIS — S81.801D LEG WOUND, RIGHT, SUBSEQUENT ENCOUNTER: ICD-10-CM

## 2022-12-01 DIAGNOSIS — Z09 HOSPITAL DISCHARGE FOLLOW-UP: Primary | ICD-10-CM

## 2022-12-01 DIAGNOSIS — E78.2 MIXED HYPERLIPIDEMIA: ICD-10-CM

## 2022-12-01 DIAGNOSIS — I10 ESSENTIAL HYPERTENSION: ICD-10-CM

## 2022-12-01 PROCEDURE — 99214 OFFICE O/P EST MOD 30 MIN: CPT | Performed by: NURSE PRACTITIONER

## 2022-12-01 PROCEDURE — 3074F SYST BP LT 130 MM HG: CPT | Performed by: NURSE PRACTITIONER

## 2022-12-01 PROCEDURE — 3078F DIAST BP <80 MM HG: CPT | Performed by: NURSE PRACTITIONER

## 2022-12-01 NOTE — ED PROVIDER NOTES
EMERGENCY DEPARTMENT HISTORY AND PHYSICAL EXAM      Date: 2022  Patient Name: Juan Blair. History of Presenting Illness     Chief Complaint   Patient presents with    Leg Injury       History Provided By: Patient    HPI: Juan Blair., 61 y.o. male reports he bumped right lower leg on a piece of wood a week ago. He has noted some swelling and bruising at the angle of the right foot since 3 days ago. He is a diabetic and he is on Plavix. He also reports some redness just below the bump area. He has no significant pain. He has no fever. There are no other complaints, changes, or physical findings at this time. Past History     Past Medical History:  Past Medical History:   Diagnosis Date    Atherosclerosis of right carotid artery     Cerebral atherosclerosis     Hyperlipidemia     Hypertension     Non-compliance     Stroke [facility])        Past Surgical History:  Past Surgical History:   Procedure Laterality Date    HX CAROTID STENT         Family History:  Family History   Problem Relation Age of Onset    OSTEOARTHRITIS Mother     Heart Disease Father     Heart Disease Brother        Social History:  Social History     Tobacco Use    Smoking status: Former     Packs/day: 1.00     Years: 35.00     Pack years: 35.00     Types: Cigarettes     Quit date: 2020     Years since quittin.8    Smokeless tobacco: Never   Substance Use Topics    Alcohol use: Never     Comment: 3 liquor drinks in the evenings recenlty started    Drug use: Never       Allergies:  No Known Allergies    PCP: May Villegas NP    No current facility-administered medications on file prior to encounter.      Current Outpatient Medications on File Prior to Encounter   Medication Sig Dispense Refill    metFORMIN ER (GLUCOPHAGE XR) 500 mg tablet TAKE 2 TABLETS BY MOUTH ONCE DAILY WITH BREAKFAST 120 Tablet 1    losartan-hydroCHLOROthiazide (HYZAAR) 100-12.5 mg per tablet Take 1 tablet by mouth once daily 90 Tablet 1    Jardiance 25 mg tablet Take 1 tablet by mouth once daily 90 Tablet 0    SITagliptin (Januvia) 100 mg tablet Take 1 Tablet by mouth daily. 90 Tablet 1    clopidogreL (PLAVIX) 75 mg tab Take 75 mg by mouth daily. atorvastatin (LIPITOR) 80 mg tablet Take 1 Tablet by mouth every other day. 90 Tablet 1    glucose blood VI test strips (OneTouch Ultra Test) strip Use 1 test strip to obtain single drop of blood for daily monitoring of blood glucose 100 Strip 1    aspirin delayed-release 81 mg tablet Take 325 mg by mouth daily. multivitamin (ONE A DAY) tablet Take 1 Tablet by mouth daily. cholecalciferol (VITAMIN D3) (1000 Units /25 mcg) tablet Take  by mouth daily. Review of Systems   Review of Systems   All other systems reviewed and are negative. Physical Exam   Physical Exam  Vitals and nursing note reviewed. Constitutional:       General: He is not in acute distress. Appearance: He is well-developed. He is obese. He is not diaphoretic. Comments: Obese male in no acute distress. HENT:      Head: Normocephalic and atraumatic. No right periorbital erythema or left periorbital erythema. Jaw: No trismus. Right Ear: External ear normal. No drainage or swelling. Tympanic membrane is not perforated, erythematous or bulging. Left Ear: External ear normal. No drainage or swelling. Tympanic membrane is not perforated, erythematous or bulging. Nose: Nose normal. No mucosal edema or rhinorrhea. Right Sinus: No maxillary sinus tenderness or frontal sinus tenderness. Left Sinus: No maxillary sinus tenderness or frontal sinus tenderness. Mouth/Throat:      Mouth: No oral lesions. Dentition: No dental abscesses. Pharynx: Uvula midline. No oropharyngeal exudate, posterior oropharyngeal erythema or uvula swelling. Tonsils: No tonsillar abscesses. Eyes:      General: No scleral icterus. Right eye: No discharge. Left eye: No discharge. Conjunctiva/sclera: Conjunctivae normal.   Cardiovascular:      Rate and Rhythm: Normal rate and regular rhythm. Heart sounds: Normal heart sounds. No murmur heard. No friction rub. No gallop. Pulmonary:      Effort: Pulmonary effort is normal. No tachypnea, accessory muscle usage or respiratory distress. Breath sounds: Normal breath sounds. No decreased breath sounds, wheezing, rhonchi or rales. Abdominal:      General: Bowel sounds are normal. There is no distension. Palpations: Abdomen is soft. Tenderness: There is no abdominal tenderness. Musculoskeletal:         General: No tenderness. Normal range of motion. Cervical back: Normal range of motion and neck supple. Lymphadenopathy:      Cervical: No cervical adenopathy. Skin:     General: Skin is warm and dry. Comments: Contusion mid anterior tib-fib area with some swelling, tenderness and slight erythema extending distally. Above it is a small open abrasion. No signs of infection no exudates. At the ankle medially is some ecchymosis. There is also some swelling anterior ankle and a mildly of the foot. No obvious cellulitis of the foot. Neurological:      General: No focal deficit present. Mental Status: He is alert and oriented to person, place, and time. Psychiatric:         Judgment: Judgment normal.       Lab and Diagnostic Study Results   Labs -   No results found for this or any previous visit (from the past 12 hour(s)). Radiologic Studies -   @lastxrresult@  CT Results  (Last 48 hours)      None          CXR Results  (Last 48 hours)      None            Medical Decision Making and ED Course   Differential Diagnosis & Medical Decision Making Provider Note:       - I am the first provider for this patient. I reviewed the vital signs, available nursing notes, past medical history, past surgical history, family history and social history.  The patients presenting problems have been discussed, and they are in agreement with the care plan formulated and outlined with them. I have encouraged them to ask questions as they arise throughout their visit. Vital Signs-Reviewed the patient's vital signs. No data found. ED Course:   Any more signs of early cellulitis. Patient placed on doxycycline. He is to follow-up with his PCP in 2 to 3 days for recheck. Procedures     Disposition   Disposition: Condition stable  DC- Adult Discharges: All of the diagnostic tests were reviewed and questions answered. Diagnosis, care plan and treatment options were discussed. The patient understands the instructions and will follow up as directed. The patients results have been reviewed with them. They have been counseled regarding their diagnosis. The patient verbally convey understanding and agreement of the signs, symptoms, diagnosis, treatment and prognosis and additionally agrees to follow up as recommended with their PCP in 24 - 48 hours. They also agree with the care-plan and convey that all of their questions have been answered. I have also put together some discharge instructions for them that include: 1) educational information regarding their diagnosis, 2) how to care for their diagnosis at home, as well a 3) list of reasons why they would want to return to the ED prior to their follow-up appointment, should their condition change. DISCHARGE PLAN:  1. Cannot display discharge medications since this patient is not currently admitted. 2.   Follow-up Information       Follow up With Specialties Details Why Contact Jose Mclaughlin NP Nurse Practitioner In 2 days For wound re-check [id number] [facility]  [address] [address]  [phone]            3. Return to ED if worse   4. Discharge Medication List as of 11/29/2022  3:30 PM         Remove if admitted/transferred    Diagnosis/Clinical Impression     Clinical Impression:   1.  Contusion of right lower extremity, initial encounter    2. Cellulitis of right lower extremity        Attestations: IStephania MD, am the primary clinician of record. Please note that this dictation was completed with disco volante, the computer voice recognition software. Quite often unanticipated grammatical, syntax, homophones, and other interpretive errors are inadvertently transcribed by the computer software. Please disregard these errors. Please excuse any errors that have escaped final proofreading. Thank you.

## 2022-12-01 NOTE — PROGRESS NOTES
History of Present Illness  Tati Garcia is a 61 y.o. male who presents today for:    Chief Complaint   Patient presents with    Follow-up     ER Follow up,  Wound check  Pt was seen at ER on11/29/22 for contusion of right leg. Past Medical History  Past Medical History:   Diagnosis Date    Atherosclerosis of right carotid artery     Cerebral atherosclerosis     Hyperlipidemia     Hypertension     Non-compliance     Stroke [facility])         Surgical History  Past Surgical History:   Procedure Laterality Date    HX CAROTID STENT  12/82021        Current Medications  Current Outpatient Medications   Medication Sig    doxycycline (MONODOX) 100 mg capsule Take 1 Capsule by mouth two (2) times a day for 7 days. metFORMIN ER (GLUCOPHAGE XR) 500 mg tablet TAKE 2 TABLETS BY MOUTH ONCE DAILY WITH BREAKFAST    losartan-hydroCHLOROthiazide (HYZAAR) 100-12.5 mg per tablet Take 1 tablet by mouth once daily    Jardiance 25 mg tablet Take 1 tablet by mouth once daily    SITagliptin (Januvia) 100 mg tablet Take 1 Tablet by mouth daily. clopidogreL (PLAVIX) 75 mg tab Take 75 mg by mouth daily. atorvastatin (LIPITOR) 80 mg tablet Take 1 Tablet by mouth every other day. glucose blood VI test strips (OneTouch Ultra Test) strip Use 1 test strip to obtain single drop of blood for daily monitoring of blood glucose    aspirin delayed-release 81 mg tablet Take 325 mg by mouth daily. multivitamin (ONE A DAY) tablet Take 1 Tablet by mouth daily. cholecalciferol (VITAMIN D3) (1000 Units /25 mcg) tablet Take  by mouth daily. No current facility-administered medications for this visit.        Allergies/Drug Reactions  No Known Allergies     Family History  Family History   Problem Relation Age of Onset    OSTEOARTHRITIS Mother     Heart Disease Father     Heart Disease Brother         Social History  Social History     Tobacco Use    Smoking status: Former     Packs/day: 1.00     Years: 35.00     Pack years: 35.00 Types: Cigarettes     Quit date: 2020     Years since quittin.8    Smokeless tobacco: Never   Substance Use Topics    Alcohol use: Never     Comment: 3 liquor drinks in the evenings recenlty started    Drug use: Never        Health Maintenance   Topic Date Due    Pneumococcal 0-64 years (1 - PCV) Never done    Eye Exam Retinal or Dilated  Never done    DTaP/Tdap/Td series (1 - Tdap) Never done    Colorectal Cancer Screening Combo  Never done    Shingrix Vaccine Age 50> (1 of 2) Never done    Low dose CT lung screening  Never done    Lipid Screen  2021    A1C test (Diabetic or Prediabetic)  2021    Foot Exam Q1  10/29/2021    MICROALBUMIN Q1  10/29/2021    COVID-19 Vaccine (1) 2024 (Originally 1956)    Depression Screen  2023    Flu Vaccine  Completed    Hepatitis C Screening  Discontinued     Immunization History   Administered Date(s) Administered    Influenza, FLUAD, (age 72 y+), Adjuvanted 2021    Influenza, FLUARIX, FLULAVAL, FLUZONE (age 10 mo+) AND AFLURIA, (age 1 y+), PF, 0.5mL 10/28/2022    Influenza, FLUBLOK, (age 25 y+), PF 10/29/2020     Physical Exam  Vital signs:   Vitals:    22 1450   BP: 119/71   Pulse: 72   Resp: 18   Temp: 98.2 °F (36.8 °C)   TempSrc: Temporal   SpO2: 92%   Weight: 277 lb (125.6 kg)   Height: 5' 11\" (1.803 m)     Laboratory/Tests:  No visits with results within 3 Month(s) from this visit.    Latest known visit with results is:   Admission on 2022, Discharged on 2022   Component Date Value Ref Range Status    Ventricular Rate 2022 76  BPM Final    Atrial Rate 2022 76  BPM Final    P-R Interval 2022 146  ms Final    QRS Duration 2022 108  ms Final    Q-T Interval 2022 380  ms Final    QTC Calculation (Bezet) 2022 428  ms Final    Calculated P Axis 2022 17  degrees Final    Calculated R Axis 2022 -21  degrees Final    Calculated T Axis 2022 -5  degrees Final    Diagnosis 02/11/2022    Final                    Value:Sinus rhythm  Borderline left axis deviation  Borderline T abnormalities, inferior leads    Confirmed by SILVANO UMANA ([id number]) on 2/11/2022 8:30:06 PM      WBC 02/11/2022 8.3  4.6 - 13.2 K/uL Final    RBC 02/11/2022 5.16  4.35 - 5.65 M/uL Final    HGB 02/11/2022 15.4  13.0 - 16.0 g/dL Final    HCT 02/11/2022 46.2  36.0 - 48.0 % Final    MCV 02/11/2022 89.5  78.0 - 100.0 FL Final    MCH 02/11/2022 29.8  24.0 - 34.0 PG Final    MCHC 02/11/2022 33.3  31.0 - 37.0 g/dL Final    RDW 02/11/2022 12.2  11.6 - 14.5 % Final    PLATELET 71/71/6578 383  135 - 420 K/uL Final    MPV 02/11/2022 10.6  9.2 - 11.8 FL Final    NRBC 02/11/2022 0.0  0.0  WBC Final    ABSOLUTE NRBC 02/11/2022 0.00  0.00 - 0.01 K/uL Final    NEUTROPHILS 02/11/2022 59  40 - 73 % Final    LYMPHOCYTES 02/11/2022 25  21 - 52 % Final    MONOCYTES 02/11/2022 15 (A)  3 - 10 % Final    EOSINOPHILS 02/11/2022 0  0 - 5 % Final    BASOPHILS 02/11/2022 0  0 - 2 % Final    IMMATURE GRANULOCYTES 02/11/2022 1 (A)  0 - 0.5 % Final    ABS. NEUTROPHILS 02/11/2022 4.9  1.8 - 8.0 K/UL Final    ABS. LYMPHOCYTES 02/11/2022 2.1  0.9 - 3.6 K/UL Final    ABS. MONOCYTES 02/11/2022 1.2  0.05 - 1.2 K/UL Final    ABS. EOSINOPHILS 02/11/2022 0.0  0.0 - 0.4 K/UL Final    ABS. BASOPHILS 02/11/2022 0.0  0.0 - 0.1 K/UL Final    ABS. IMM.  GRANS. 02/11/2022 0.1 (A)  0.00 - 0.04 K/UL Final    DF 02/11/2022 AUTOMATED    Final    Sodium 02/11/2022 131 (A)  135 - 145 mmol/L Final    Potassium 02/11/2022 3.7  3.2 - 5.1 mmol/L Final    Chloride 02/11/2022 94  94 - 111 mmol/L Final    CO2 02/11/2022 23  21 - 33 mmol/L Final    Anion gap 02/11/2022 14  mmol/L Final    Glucose 02/11/2022 132 (A)  70 - 110 mg/dL Final    BUN 02/11/2022 13  9 - 21 mg/dL Final    Creatinine 02/11/2022 0.70 (A)  0.8 - 1.50 mg/dL Final    BUN/Creatinine ratio 02/11/2022 19    Final    GFR est AA 02/11/2022 >60  ml/min/1.73m2 Final    GFR est non-AA 02/11/2022 >60 ml/min/1.73m2 Final    Comment: Estimated GFR is calculated using the IDMS-traceable Modification of Diet in Renal Disease (MDRD) Study equation, reported for both  Americans (GFRAA) and non- Americans (GFRNA), and normalized to 1.73m2 body surface area. The physician must decide which value applies to the patient. The MDRD study equation should only be used in individuals age 25 or older. It has not been validated for the following: pregnant women, patients with serious comorbid conditions, or on certain medications, or persons with extremes of body size, muscle mass, or nutritional status. Calcium 02/11/2022 8.9  8.5 - 10.5 mg/dL Final    Troponin-I, Qt. 02/11/2022 <0.02 (A)  0.02 - 0.05 ng/mL Final    Protein, total 02/11/2022 8.1  6.1 - 8.4 g/dL Final    Albumin 02/11/2022 3.8  3.5 - 4.7 g/dL Final    Globulin 02/11/2022 4.3  g/dL Final    A-G Ratio 02/11/2022 0.9    Final    Bilirubin, total 02/11/2022 1.0  0.2 - 1.0 mg/dL Final    Bilirubin, direct 02/11/2022 0.2  0.0 - 0.3 mg/dL Final    Alk.  phosphatase 02/11/2022 54  38 - 126 U/L Final    AST (SGOT) 02/11/2022 26  17 - 74 U/L Final    ALT (SGPT) 02/11/2022 19  3 - 72 U/L Final    Lactic acid 02/11/2022 1.4  0.5 - 2.0 mmol/L Final    Magnesium 02/11/2022 2.2  1.7 - 2.8 mg/dL Final    Color 02/11/2022 Yellow    Final    Color Reference Range: Straw, Yellow or Dark Yellow    Appearance 02/11/2022 Clear    Final    Specific gravity 02/11/2022 >1.030 (A)  1.005 - 1.030 Final    pH (UA) 02/11/2022 5.5  5.0 - 8.0   Final    Protein 02/11/2022 100 (A)  Negative mg/dL Final    Glucose 02/11/2022 >1,000 (A)  Negative mg/dL Final    Ketone 02/11/2022 >80 (A)  Negative mg/dL Final    Bilirubin 02/11/2022 Negative  Negative   Final    Blood 02/11/2022 Negative  Negative   Final    Urobilinogen 02/11/2022 1.0  0.2 - 1.0 EU/dL Final    Nitrites 02/11/2022 Negative  Negative   Final    Leukocyte Esterase 02/11/2022 Negative  Negative   Final    WBC 02/11/2022 0-4  0 - 4 /hpf Final    RBC 02/11/2022 0-5  0 - 2 /hpf Final    Epithelial cells 02/11/2022 Few  0 - 20 /lpf Final    Epithelial cell category consists of squamous cells and/or transitional urothelial cells. Renal tubular cells, if present, are separately identified as such. Bacteria 02/11/2022 Negative (A)  None /hpf Final     Patient reports ED visit on 11/29/2022 with chief complaint of right lower leg injury. He reports he struck his right anterior lower leg with piece of wood after demolition of dresser. There were no signs of infection or exudates. At the ankle medially there was some ecchymosis. There was also some swelling anterior ankle and a mildly of the foot. No obvious cellulitis of the foot. Xray of right lower leg revealed no fractures. Patient was prescribed Doxycycline and discharged same day. Wound Care Image:  Wound Photo   Right anterior lower leg wound. 12/01/2022 15:22   Attached To: Office Visit on 12/1/22 with Russell Mcfadden NP       Assessment/Plan:    Hospital discharge follow up. Please medication reconciliation Hospital discharge follow-up. Right leg wound subsequent encounter. Continue Doxycycline 100 mg capsule twice daily for management of right leg wound. Mixed hyperlipidemia. Continue Atorvastatin 80 mg tablet every other day for management of mixed hyperlipidemia. Essential hypertension. Continue Losartan/HCTZ 100/12.5 mg tablet daily for management of essential hypertension. I have discussed the diagnosis with the patient and the intended plan as seen in the above orders. The patient has received an after-visit summary and questions were answered concerning future plans. I have discussed medication side effects and warnings with the patient as well. I have reviewed the plan of care with the patient, accepted their input and they are in agreement with the treatment goals.        Vadim Lucero NP  December 1, 2022

## 2022-12-01 NOTE — PROGRESS NOTES
Gina Mccray. presents today for   Chief Complaint   Patient presents with    Follow-up     ER Follow up,  Wound check  Pt was seen at ER on11/29/22 for contusion of right leg. Is someone accompanying this pt? Yes, wife    Is the patient using any DME equipment during [address]? no    Depression Screening:  3 most recent PHQ Screens 12/1/2022   Little interest or pleasure in doing things Not at all   Feeling down, depressed, irritable, or hopeless Not at all   Total Score PHQ 2 0       Learning Assessment:  Learning Assessment 10/29/2020   PRIMARY LEARNER Patient   HIGHEST LEVEL OF EDUCATION - PRIMARY LEARNER  -   BARRIERS PRIMARY LEARNER -   CO-LEARNER CAREGIVER -   PRIMARY LANGUAGE ENGLISH   LEARNER PREFERENCE PRIMARY DEMONSTRATION   ANSWERED BY patient   RELATIONSHIP SELF       Fall Risk  No flowsheet data found. ADL  ADL Assessment 7/20/2022   Feeding yourself No Help Needed   Getting from bed to chair No Help Needed   Getting dressed No Help Needed   Bathing or showering No Help Needed   Walk across the room (includes cane/walker) No Help Needed   Using the telphone No Help Needed   Taking your medications No Help Needed   Preparing meals No Help Needed   Managing money (expenses/bills) No Help Needed   Moderately strenuous housework (laundry) No Help Needed   Shopping for personal items (toiletries/medicines) No Help Needed   Shopping for groceries No Help Needed   Driving No Help Needed   Climbing a flight of stairs No Help Needed   Getting to places beyond walking distances No Help Needed       Health Maintenance reviewed and discussed and ordered per Provider.     Health Maintenance Due   Topic Date Due    Pneumococcal 0-64 years (1 - PCV) Never done    Eye Exam Retinal or Dilated  Never done    DTaP/Tdap/Td series (1 - Tdap) Never done    Colorectal Cancer Screening Combo  Never done    Shingrix Vaccine Age 50> (1 of 2) Never done    Low dose CT lung screening  Never done    Lipid Screen 01/12/2021    A1C test (Diabetic or Prediabetic)  01/29/2021    Foot Exam Q1  10/29/2021    MICROALBUMIN Q1  10/29/2021   . Coordination of Care:  1. \"Have you been to the ER, urgent care clinic since your last visit? Hospitalized since your last visit? \" Yes When: 11/29/2022 Where: [facility] Reason for visit: contusion of right knee    2. \"Have you seen or consulted any other health care providers outside of the [address] [place] [address] since your last visit? \" No     3. For patients aged 39-70: Has the patient had a colonoscopy? No     If the patient is female:    4. For patients aged 41-77: Has the patient had a mammogram within the past 2 years? NA - based on age / sex    11. For patients aged 21-65: Has the patient had a pap smear?  NA - based on age  / sex

## 2023-01-20 ENCOUNTER — OFFICE VISIT (OUTPATIENT)
Dept: FAMILY MEDICINE CLINIC | Age: 68
End: 2023-01-20
Payer: MEDICARE

## 2023-01-20 VITALS
HEART RATE: 69 BPM | SYSTOLIC BLOOD PRESSURE: 132 MMHG | HEIGHT: 71 IN | BODY MASS INDEX: 38.19 KG/M2 | DIASTOLIC BLOOD PRESSURE: 85 MMHG | OXYGEN SATURATION: 95 % | TEMPERATURE: 98.3 F | RESPIRATION RATE: 18 BRPM | WEIGHT: 272.8 LBS

## 2023-01-20 DIAGNOSIS — R68.89 OTHER GENERAL SYMPTOMS AND SIGNS: ICD-10-CM

## 2023-01-20 DIAGNOSIS — I67.2 CEREBRAL ATHEROSCLEROSIS: ICD-10-CM

## 2023-01-20 DIAGNOSIS — E78.2 MIXED HYPERLIPIDEMIA: ICD-10-CM

## 2023-01-20 DIAGNOSIS — I10 ESSENTIAL HYPERTENSION: ICD-10-CM

## 2023-01-20 DIAGNOSIS — Z12.5 SCREENING PSA (PROSTATE SPECIFIC ANTIGEN): ICD-10-CM

## 2023-01-20 DIAGNOSIS — E11.65 TYPE 2 DIABETES MELLITUS WITH HYPERGLYCEMIA, WITHOUT LONG-TERM CURRENT USE OF INSULIN (HCC): ICD-10-CM

## 2023-01-20 DIAGNOSIS — E66.01 SEVERE OBESITY (BMI 35.0-39.9) WITH COMORBIDITY (HCC): ICD-10-CM

## 2023-01-20 DIAGNOSIS — I10 ESSENTIAL HYPERTENSION: Primary | ICD-10-CM

## 2023-01-20 PROCEDURE — 3017F COLORECTAL CA SCREEN DOC REV: CPT | Performed by: NURSE PRACTITIONER

## 2023-01-20 PROCEDURE — 99214 OFFICE O/P EST MOD 30 MIN: CPT | Performed by: NURSE PRACTITIONER

## 2023-01-20 PROCEDURE — 1101F PT FALLS ASSESS-DOCD LE1/YR: CPT | Performed by: NURSE PRACTITIONER

## 2023-01-20 PROCEDURE — 3051F HG A1C>EQUAL 7.0%<8.0%: CPT | Performed by: NURSE PRACTITIONER

## 2023-01-20 PROCEDURE — G8427 DOCREV CUR MEDS BY ELIG CLIN: HCPCS | Performed by: NURSE PRACTITIONER

## 2023-01-20 PROCEDURE — G8432 DEP SCR NOT DOC, RNG: HCPCS | Performed by: NURSE PRACTITIONER

## 2023-01-20 PROCEDURE — G8417 CALC BMI ABV UP PARAM F/U: HCPCS | Performed by: NURSE PRACTITIONER

## 2023-01-20 PROCEDURE — G8536 NO DOC ELDER MAL SCRN: HCPCS | Performed by: NURSE PRACTITIONER

## 2023-01-20 PROCEDURE — 1123F ACP DISCUSS/DSCN MKR DOCD: CPT | Performed by: NURSE PRACTITIONER

## 2023-01-20 PROCEDURE — 3078F DIAST BP <80 MM HG: CPT | Performed by: NURSE PRACTITIONER

## 2023-01-20 PROCEDURE — 2022F DILAT RTA XM EVC RTNOPTHY: CPT | Performed by: NURSE PRACTITIONER

## 2023-01-20 PROCEDURE — 3074F SYST BP LT 130 MM HG: CPT | Performed by: NURSE PRACTITIONER

## 2023-01-20 NOTE — PROGRESS NOTES
History of Present Illness  Seamus Valerio is a 74 y.o. male who presents today for:    Chief Complaint   Patient presents with    Follow-up     6m follow up. Past Medical History  Past Medical History:   Diagnosis Date    Atherosclerosis of right carotid artery     Cerebral atherosclerosis     Hyperlipidemia     Hypertension     Non-compliance     Stroke [facility])         Surgical History  Past Surgical History:   Procedure Laterality Date    HX CAROTID STENT          Current Medications  Current Outpatient Medications   Medication Sig    metFORMIN ER (GLUCOPHAGE XR) 500 mg tablet TAKE 2 TABLETS BY MOUTH ONCE DAILY WITH BREAKFAST    losartan-hydroCHLOROthiazide (HYZAAR) 100-12.5 mg per tablet Take 1 tablet by mouth once daily    Jardiance 25 mg tablet Take 1 tablet by mouth once daily    SITagliptin (Januvia) 100 mg tablet Take 1 Tablet by mouth daily. clopidogreL (PLAVIX) 75 mg tab Take 75 mg by mouth daily. atorvastatin (LIPITOR) 80 mg tablet Take 1 Tablet by mouth every other day. glucose blood VI test strips (OneTouch Ultra Test) strip Use 1 test strip to obtain single drop of blood for daily monitoring of blood glucose    aspirin delayed-release 81 mg tablet Take 325 mg by mouth daily. multivitamin (ONE A DAY) tablet Take 1 Tablet by mouth daily. cholecalciferol (VITAMIN D3) (1000 Units /25 mcg) tablet Take  by mouth daily. No current facility-administered medications for this visit.        Allergies/Drug Reactions  No Known Allergies     Family History  Family History   Problem Relation Age of Onset    OSTEOARTHRITIS Mother     Heart Disease Father     Heart Disease Brother         Social History  Social History     Tobacco Use    Smoking status: Former     Packs/day: 1.00     Years: 35.00     Pack years: 35.00     Types: Cigarettes     Quit date: 2020     Years since quittin.9    Smokeless tobacco: Never   Substance Use Topics    Alcohol use: Never     Comment: 3 liquor drinks in the evenings recenlty started    Drug use: Never        Health Maintenance   Topic Date Due    Pneumococcal 65+ years (1 - PCV) Never done    Eye Exam Retinal or Dilated  Never done    DTaP/Tdap/Td series (1 - Tdap) Never done    Colorectal Cancer Screening Combo  Never done    Shingles Vaccine (1 of 2) Never done    Low dose CT lung screening  Never done    Lipid Screen  01/12/2021    Diabetic Alb to Cr ratio (uACR) test  10/29/2021    Foot Exam Q1  10/29/2021    A1C test (Diabetic or Prediabetic)  10/29/2021    AAA Screening 73-67 YO Male Smoking Patients  Never done    GFR test (Diabetes, CKD 3-4, OR last GFR 15-59)  02/11/2023    COVID-19 Vaccine (1) 01/01/2024 (Originally 6/27/1956)    Depression Screen  12/01/2023    Flu Vaccine  Completed    Hepatitis C Screening  Discontinued     Immunization History   Administered Date(s) Administered    Influenza, FLUAD, (age 72 y+), Adjuvanted 11/11/2021    Influenza, FLUARIX, FLULAVAL, FLUZONE (age 10 mo+) AND AFLURIA, (age 1 y+), PF, 0.5mL 10/28/2022    Influenza, FLUBLOK, (age 25 y+), PF 10/29/2020     Physical Exam  Vital signs:   Vitals:    01/20/23 1406 01/20/23 1408   BP: (!) 147/85 132/85   Pulse: 66 69   Resp: 18 18   Temp: 98.3 °F (36.8 °C)    TempSrc: Temporal    SpO2: 96% 95%   Weight: 272 lb 12.8 oz (123.7 kg)    Height: 5' 11\" (1.803 m)      Laboratory/Tests:  No visits with results within 3 Month(s) from this visit.    Latest known visit with results is:   Admission on 02/11/2022, Discharged on 02/11/2022   Component Date Value Ref Range Status    Ventricular Rate 02/11/2022 76  BPM Final    Atrial Rate 02/11/2022 76  BPM Final    P-R Interval 02/11/2022 146  ms Final    QRS Duration 02/11/2022 108  ms Final    Q-T Interval 02/11/2022 380  ms Final    QTC Calculation (Bezet) 02/11/2022 428  ms Final    Calculated P Axis 02/11/2022 17  degrees Final    Calculated R Axis 02/11/2022 -21  degrees Final    Calculated T Axis 02/11/2022 -5  degrees Final    Diagnosis 02/11/2022    Final                    Value:Sinus rhythm  Borderline left axis deviation  Borderline T abnormalities, inferior leads    Confirmed by SILVANO UMANA ([id number]) on 2/11/2022 8:30:06 PM      WBC 02/11/2022 8.3  4.6 - 13.2 K/uL Final    RBC 02/11/2022 5.16  4.35 - 5.65 M/uL Final    HGB 02/11/2022 15.4  13.0 - 16.0 g/dL Final    HCT 02/11/2022 46.2  36.0 - 48.0 % Final    MCV 02/11/2022 89.5  78.0 - 100.0 FL Final    MCH 02/11/2022 29.8  24.0 - 34.0 PG Final    MCHC 02/11/2022 33.3  31.0 - 37.0 g/dL Final    RDW 02/11/2022 12.2  11.6 - 14.5 % Final    PLATELET 83/30/8437 123  135 - 420 K/uL Final    MPV 02/11/2022 10.6  9.2 - 11.8 FL Final    NRBC 02/11/2022 0.0  0.0  WBC Final    ABSOLUTE NRBC 02/11/2022 0.00  0.00 - 0.01 K/uL Final    NEUTROPHILS 02/11/2022 59  40 - 73 % Final    LYMPHOCYTES 02/11/2022 25  21 - 52 % Final    MONOCYTES 02/11/2022 15 (A)  3 - 10 % Final    EOSINOPHILS 02/11/2022 0  0 - 5 % Final    BASOPHILS 02/11/2022 0  0 - 2 % Final    IMMATURE GRANULOCYTES 02/11/2022 1 (A)  0 - 0.5 % Final    ABS. NEUTROPHILS 02/11/2022 4.9  1.8 - 8.0 K/UL Final    ABS. LYMPHOCYTES 02/11/2022 2.1  0.9 - 3.6 K/UL Final    ABS. MONOCYTES 02/11/2022 1.2  0.05 - 1.2 K/UL Final    ABS. EOSINOPHILS 02/11/2022 0.0  0.0 - 0.4 K/UL Final    ABS. BASOPHILS 02/11/2022 0.0  0.0 - 0.1 K/UL Final    ABS. IMM.  GRANS. 02/11/2022 0.1 (A)  0.00 - 0.04 K/UL Final    DF 02/11/2022 AUTOMATED    Final    Sodium 02/11/2022 131 (A)  135 - 145 mmol/L Final    Potassium 02/11/2022 3.7  3.2 - 5.1 mmol/L Final    Chloride 02/11/2022 94  94 - 111 mmol/L Final    CO2 02/11/2022 23  21 - 33 mmol/L Final    Anion gap 02/11/2022 14  mmol/L Final    Glucose 02/11/2022 132 (A)  70 - 110 mg/dL Final    BUN 02/11/2022 13  9 - 21 mg/dL Final    Creatinine 02/11/2022 0.70 (A)  0.8 - 1.50 mg/dL Final    BUN/Creatinine ratio 02/11/2022 19    Final    GFR est AA 02/11/2022 >60  ml/min/1.73m2 Final    GFR est non-AA 02/11/2022 >60  ml/min/1.73m2 Final    Comment: Estimated GFR is calculated using the IDMS-traceable Modification of Diet in Renal Disease (MDRD) Study equation, reported for both  Americans (GFRAA) and non- Americans (GFRNA), and normalized to 1.73m2 body surface area. The physician must decide which value applies to the patient. The MDRD study equation should only be used in individuals age 25 or older. It has not been validated for the following: pregnant women, patients with serious comorbid conditions, or on certain medications, or persons with extremes of body size, muscle mass, or nutritional status. Calcium 02/11/2022 8.9  8.5 - 10.5 mg/dL Final    Troponin-I, Qt. 02/11/2022 <0.02 (A)  0.02 - 0.05 ng/mL Final    Protein, total 02/11/2022 8.1  6.1 - 8.4 g/dL Final    Albumin 02/11/2022 3.8  3.5 - 4.7 g/dL Final    Globulin 02/11/2022 4.3  g/dL Final    A-G Ratio 02/11/2022 0.9    Final    Bilirubin, total 02/11/2022 1.0  0.2 - 1.0 mg/dL Final    Bilirubin, direct 02/11/2022 0.2  0.0 - 0.3 mg/dL Final    Alk.  phosphatase 02/11/2022 54  38 - 126 U/L Final    AST (SGOT) 02/11/2022 26  17 - 74 U/L Final    ALT (SGPT) 02/11/2022 19  3 - 72 U/L Final    Lactic acid 02/11/2022 1.4  0.5 - 2.0 mmol/L Final    Magnesium 02/11/2022 2.2  1.7 - 2.8 mg/dL Final    Color 02/11/2022 Yellow    Final    Color Reference Range: Straw, Yellow or Dark Yellow    Appearance 02/11/2022 Clear    Final    Specific gravity 02/11/2022 >1.030 (A)  1.005 - 1.030 Final    pH (UA) 02/11/2022 5.5  5.0 - 8.0   Final    Protein 02/11/2022 100 (A)  Negative mg/dL Final    Glucose 02/11/2022 >1,000 (A)  Negative mg/dL Final    Ketone 02/11/2022 >80 (A)  Negative mg/dL Final    Bilirubin 02/11/2022 Negative  Negative   Final    Blood 02/11/2022 Negative  Negative   Final    Urobilinogen 02/11/2022 1.0  0.2 - 1.0 EU/dL Final    Nitrites 02/11/2022 Negative  Negative   Final    Leukocyte Esterase 02/11/2022 Negative  Negative Final    WBC 02/11/2022 0-4  0 - 4 /hpf Final    RBC 02/11/2022 0-5  0 - 2 /hpf Final    Epithelial cells 02/11/2022 Few  0 - 20 /lpf Final    Epithelial cell category consists of squamous cells and/or transitional urothelial cells. Renal tubular cells, if present, are separately identified as such. Bacteria 02/11/2022 Negative (A)  None /hpf Final     Patient reports no pain or discomfort at this time. Physical examination performed:  Bilateral ear tympanic membrane visualized on otoscopic examination revealed abnormalities. Cardiac auscultation revealed no arrhythmias or murmurs. Bilateral lung sounds clear to auscultation in all fields. Abdomen soft and nontender, bowel sounds normoactive in all 4 quadrants. There is no observed bilateral lower extremity edema. Call patient if laboratory results require change in treatment. A1c = 7.6 on 1/23/2023. Spoke with patient via phone call on 1/31/2023 and will prescribe Glipizide 5 mg tablet twice daily. Assessment/Plan:    Mixed hyperlipidemia. Continue Atorvastatin 80 mg tablet every other day for management of mixed hyperlipidemia. Essential hypertension. Continue Losartan/HCTZ 100/12.5 mg tablet daily for management of essential hypertension. Type 2 diabetes. Continue Jardiance 25 mg tablet, Metformin  mg tablet, take 2 tablets, Sitagliptin 100 mg tablet daily for management of type 2 diabetes. Cerebral atherosclerosis. Continue Clopidogrel 75 mg tablet daily for management of cerebral atherosclerosis. I have discussed the diagnosis with the patient and the intended plan as seen in the above orders. The patient has received an after-visit summary and questions were answered concerning future plans. I have discussed medication side effects and warnings with the patient as well. I have reviewed the plan of care with the patient, accepted their input and they are in agreement with the treatment goals.        Elias Hastings, NP  January 20, 2023

## 2023-01-20 NOTE — PROGRESS NOTES
Candice Hsu. presents today for No chief complaint on file. Is someone accompanying this pt? no    Is the patient using any DME equipment during [address]? no    Depression Screening:  3 most recent PHQ Screens 12/1/2022   Little interest or pleasure in doing things Not at all   Feeling down, depressed, irritable, or hopeless Not at all   Total Score PHQ 2 0       Learning Assessment:  Learning Assessment 10/29/2020   PRIMARY LEARNER Patient   HIGHEST LEVEL OF EDUCATION - PRIMARY LEARNER  -   BARRIERS PRIMARY LEARNER -   CO-LEARNER CAREGIVER -   PRIMARY LANGUAGE ENGLISH   LEARNER PREFERENCE PRIMARY DEMONSTRATION   ANSWERED BY patient   RELATIONSHIP SELF       Fall Risk  No flowsheet data found. ADL  ADL Assessment 12/1/2022   Feeding yourself No Help Needed   Getting from bed to chair No Help Needed   Getting dressed No Help Needed   Bathing or showering No Help Needed   Walk across the room (includes cane/walker) No Help Needed   Using the telphone No Help Needed   Taking your medications No Help Needed   Preparing meals No Help Needed   Managing money (expenses/bills) No Help Needed   Moderately strenuous housework (laundry) No Help Needed   Shopping for personal items (toiletries/medicines) No Help Needed   Shopping for groceries No Help Needed   Driving No Help Needed   Climbing a flight of stairs No Help Needed   Getting to places beyond walking distances No Help Needed       Health Maintenance reviewed and discussed and ordered per Provider.     Health Maintenance Due   Topic Date Due    Pneumococcal 65+ years (1 - PCV) Never done    Eye Exam Retinal or Dilated  Never done    DTaP/Tdap/Td series (1 - Tdap) Never done    Colorectal Cancer Screening Combo  Never done    Shingles Vaccine (1 of 2) Never done    Low dose CT lung screening  Never done    Lipid Screen  01/12/2021    Diabetic Alb to Cr ratio (uACR) test  10/29/2021    Foot Exam Q1  10/29/2021    A1C test (Diabetic or Prediabetic) 10/29/2021    AAA Screening 73-67 YO Male Smoking Patients  Never done    GFR test (Diabetes, CKD 3-4, OR last GFR 15-59)  02/11/2023   . Coordination of Care:  1. \"Have you been to the ER, urgent care clinic since your last visit? Hospitalized since your last visit? \" No    2. \"Have you seen or consulted any other health care providers outside of the [address] [place] [address] since your last visit? \" No     3. For patients aged 39-70: Has the patient had a colonoscopy? No     If the patient is female:    4. For patients aged 41-77: Has the patient had a mammogram within the past 2 years? NA - based on age // sex    11. For patients aged 21-65: Has the patient had a pap smear?  NA - based on age / sex

## 2023-01-23 ENCOUNTER — HOSPITAL ENCOUNTER (OUTPATIENT)
Dept: LAB | Age: 68
Discharge: HOME | End: 2023-01-23
Payer: MEDICARE

## 2023-01-23 LAB
ALBUMIN SERPL-MCNC: 4 G/DL (ref 3.4–5)
ALBUMIN/GLOB SERPL: 1 (ref 0.8–1.7)
ALP SERPL-CCNC: 61 U/L (ref 45–117)
ALT SERPL-CCNC: 28 U/L (ref 16–61)
ANION GAP SERPL CALC-SCNC: 8 MMOL/L (ref 3–18)
AST SERPL W P-5'-P-CCNC: 13 U/L (ref 10–38)
BASOPHILS # BLD: 0 K/UL (ref 0–0.1)
BASOPHILS NFR BLD: 0 % (ref 0–2)
BILIRUB SERPL-MCNC: 1.2 MG/DL (ref 0.2–1)
BUN SERPL-MCNC: 15 MG/DL (ref 7–18)
BUN/CREAT SERPL: 15 (ref 12–20)
CA-I BLD-MCNC: 9.4 MG/DL (ref 8.5–10.1)
CHLORIDE SERPL-SCNC: 102 MMOL/L (ref 100–111)
CO2 SERPL-SCNC: 28 MMOL/L (ref 21–32)
CREAT SERPL-MCNC: 0.97 MG/DL (ref 0.6–1.3)
DIFFERENTIAL METHOD BLD: ABNORMAL
EOSINOPHIL # BLD: 0.2 K/UL (ref 0–0.4)
EOSINOPHIL NFR BLD: 2 % (ref 0–5)
ERYTHROCYTE [DISTWIDTH] IN BLOOD BY AUTOMATED COUNT: 13.5 % (ref 11.6–14.5)
GLOBULIN SER CALC-MCNC: 4.1 G/DL (ref 2–4)
GLUCOSE SERPL-MCNC: 179 MG/DL (ref 74–99)
HCT VFR BLD AUTO: 49.3 % (ref 36–48)
HGB BLD-MCNC: 16.2 G/DL (ref 13–16)
IMM GRANULOCYTES # BLD AUTO: 0 K/UL (ref 0–0.04)
IMM GRANULOCYTES NFR BLD AUTO: 0 % (ref 0–0.5)
LYMPHOCYTES # BLD: 3.1 K/UL (ref 0.9–3.6)
LYMPHOCYTES NFR BLD: 37 % (ref 21–52)
MCH RBC QN AUTO: 30.9 PG (ref 24–34)
MCHC RBC AUTO-ENTMCNC: 32.9 G/DL (ref 31–37)
MCV RBC AUTO: 94.1 FL (ref 78–100)
MONOCYTES # BLD: 0.8 K/UL (ref 0.05–1.2)
MONOCYTES NFR BLD: 10 % (ref 3–10)
NEUTS SEG # BLD: 4.4 K/UL (ref 1.8–8)
NEUTS SEG NFR BLD: 51 % (ref 40–73)
NRBC # BLD: 0 K/UL (ref 0–0.01)
NRBC BLD-RTO: 0 PER 100 WBC
PLATELET # BLD AUTO: 204 K/UL (ref 135–420)
PMV BLD AUTO: 11.1 FL (ref 9.2–11.8)
POTASSIUM SERPL-SCNC: 3.8 MMOL/L (ref 3.5–5.5)
PROT SERPL-MCNC: 8.1 G/DL (ref 6.4–8.2)
RBC # BLD AUTO: 5.24 M/UL (ref 4.35–5.65)
SODIUM SERPL-SCNC: 138 MMOL/L (ref 136–145)
TSH SERPL DL<=0.05 MIU/L-ACNC: 1.31 UIU/ML (ref 0.36–3.74)
WBC # BLD AUTO: 8.6 K/UL (ref 4.6–13.2)

## 2023-01-23 PROCEDURE — 80061 LIPID PANEL: CPT

## 2023-01-23 PROCEDURE — 84153 ASSAY OF PSA TOTAL: CPT

## 2023-01-23 PROCEDURE — 82607 VITAMIN B-12: CPT

## 2023-01-23 PROCEDURE — 85025 COMPLETE CBC W/AUTO DIFF WBC: CPT

## 2023-01-23 PROCEDURE — 83036 HEMOGLOBIN GLYCOSYLATED A1C: CPT

## 2023-01-23 PROCEDURE — 84443 ASSAY THYROID STIM HORMONE: CPT

## 2023-01-23 PROCEDURE — 36415 COLL VENOUS BLD VENIPUNCTURE: CPT

## 2023-01-23 PROCEDURE — 80053 COMPREHEN METABOLIC PANEL: CPT

## 2023-01-23 PROCEDURE — 82043 UR ALBUMIN QUANTITATIVE: CPT

## 2023-01-24 LAB
CHOLEST SERPL-MCNC: 166 MG/DL
CREAT UR-MCNC: 122 MG/DL (ref 30–125)
EST. AVERAGE GLUCOSE BLD GHB EST-MCNC: 171 MG/DL
FOLATE SERPL-MCNC: >20 NG/ML (ref 3.1–17.5)
HBA1C MFR BLD: 7.6 % (ref 4.2–5.6)
HDLC SERPL-MCNC: 44 MG/DL (ref 40–60)
HDLC SERPL: 3.8 (ref 0–5)
LDLC SERPL CALC-MCNC: 95.8 MG/DL (ref 0–100)
LIPID PROFILE,FLP: NORMAL
MICROALBUMIN UR-MCNC: 5.76 MG/DL (ref 0–3)
MICROALBUMIN/CREAT UR-RTO: 47 MGMALB/GCRE (ref 0–30)
PSA SERPL-MCNC: 1 NG/ML (ref 0–4)
TRIGL SERPL-MCNC: 131 MG/DL (ref ?–150)
VIT B12 SERPL-MCNC: 489 PG/ML (ref 211–911)
VLDLC SERPL CALC-MCNC: 26.2 MG/DL

## 2023-01-31 DIAGNOSIS — E11.65 TYPE 2 DIABETES MELLITUS WITH HYPERGLYCEMIA, WITHOUT LONG-TERM CURRENT USE OF INSULIN (HCC): ICD-10-CM

## 2023-01-31 RX ORDER — GLIPIZIDE 5 MG/1
5 TABLET ORAL 2 TIMES DAILY
Qty: 180 TABLET | Refills: 1 | Status: SHIPPED | OUTPATIENT
Start: 2023-01-31

## 2023-02-13 NOTE — TELEPHONE ENCOUNTER
Pt wife called and stated that her  was experiencing cough weakness and was stumbling on 2/11 she took him to the ER. Pt wife stated that she took him off of his Jardiance 4 days ago, she tested his sugars and on 2/14 it was 282 and on 2/15 it was 254. He is still also coughing up phlegm and still weak. Spoke to [facility] and I advised him of the conversation and he stated to advise pt wife to keep the pt off of the [place] for a couple more days and then put him back on. Pt was made a virtual visit on 2/17 per [place]. Normal rate, regular rhythm.  Heart sounds S1, S2.  No murmurs, rubs or gallops. 11-Sep-2017 02:07

## 2023-03-15 RX ORDER — ATORVASTATIN CALCIUM 80 MG/1
TABLET, FILM COATED ORAL
Qty: 90 TABLET | Refills: 1 | Status: SHIPPED | OUTPATIENT
Start: 2023-03-15

## 2023-03-16 NOTE — TELEPHONE ENCOUNTER
Requested Prescriptions     Pending Prescriptions Disp Refills    atorvastatin (LIPITOR) 80 MG tablet 90 tablet 1     Sig: Take 1 tablet by mouth once daily

## 2023-03-17 RX ORDER — ATORVASTATIN CALCIUM 80 MG/1
TABLET, FILM COATED ORAL
Qty: 90 TABLET | Refills: 1 | OUTPATIENT
Start: 2023-03-17

## 2023-04-10 RX ORDER — SITAGLIPTIN 100 MG/1
TABLET, FILM COATED ORAL
Qty: 90 TABLET | Refills: 1 | Status: SHIPPED | OUTPATIENT
Start: 2023-04-10

## 2023-04-19 ENCOUNTER — HOSPITAL ENCOUNTER (OUTPATIENT)
Age: 68
Discharge: HOME | End: 2023-04-22
Payer: MEDICARE

## 2023-04-19 LAB
EST. AVERAGE GLUCOSE BLD GHB EST-MCNC: 134 MG/DL
HBA1C MFR BLD: 6.3 % (ref 4.2–5.6)

## 2023-04-19 PROCEDURE — 36415 COLL VENOUS BLD VENIPUNCTURE: CPT

## 2023-04-19 PROCEDURE — 83036 HEMOGLOBIN GLYCOSYLATED A1C: CPT

## 2023-04-20 ENCOUNTER — TELEPHONE (OUTPATIENT)
Facility: CLINIC | Age: 68
End: 2023-04-20

## 2023-04-20 NOTE — TELEPHONE ENCOUNTER
Spoke with patient via phone call on 4/20/2023 of laboratory results. A1c = 6.3 on 4/19/2023. Patient understood and had no further questions at this time.

## 2023-05-02 RX ORDER — METFORMIN HYDROCHLORIDE 500 MG/1
TABLET, EXTENDED RELEASE ORAL
Qty: 180 TABLET | Refills: 1 | Status: SHIPPED | OUTPATIENT
Start: 2023-05-02

## 2023-06-05 RX ORDER — CLOPIDOGREL BISULFATE 75 MG/1
75 TABLET ORAL DAILY
Qty: 90 TABLET | Refills: 1 | Status: SHIPPED | OUTPATIENT
Start: 2023-06-05

## 2023-06-05 NOTE — TELEPHONE ENCOUNTER
Was not prescribed by you, was told to ask pcp if he would be willing to fill the prescription, can you send it to [address] [facility] in [place], [place].

## 2023-07-19 RX ORDER — GLIPIZIDE 5 MG/1
5 TABLET ORAL 2 TIMES DAILY
COMMUNITY
Start: 2023-04-29

## 2023-07-24 ENCOUNTER — OFFICE VISIT (OUTPATIENT)
Facility: CLINIC | Age: 68
End: 2023-07-24

## 2023-07-24 VITALS
TEMPERATURE: 98.7 F | BODY MASS INDEX: 39.2 KG/M2 | HEIGHT: 71 IN | RESPIRATION RATE: 18 BRPM | HEART RATE: 74 BPM | OXYGEN SATURATION: 95 % | WEIGHT: 280 LBS | SYSTOLIC BLOOD PRESSURE: 133 MMHG | DIASTOLIC BLOOD PRESSURE: 77 MMHG

## 2023-07-24 DIAGNOSIS — Z87.891 PERSONAL HISTORY OF TOBACCO USE: ICD-10-CM

## 2023-07-24 DIAGNOSIS — Z00.00 MEDICARE ANNUAL WELLNESS VISIT, INITIAL: Primary | ICD-10-CM

## 2023-07-24 DIAGNOSIS — Z13.6 SCREENING FOR AAA (ABDOMINAL AORTIC ANEURYSM): ICD-10-CM

## 2023-07-24 DIAGNOSIS — N52.9 ERECTILE DYSFUNCTION, UNSPECIFIED ERECTILE DYSFUNCTION TYPE: ICD-10-CM

## 2023-07-24 DIAGNOSIS — E11.65 TYPE 2 DIABETES MELLITUS WITH HYPERGLYCEMIA, WITHOUT LONG-TERM CURRENT USE OF INSULIN (HCC): ICD-10-CM

## 2023-07-24 DIAGNOSIS — I10 PRIMARY HYPERTENSION: ICD-10-CM

## 2023-07-24 DIAGNOSIS — E11.9 TYPE 2 DIABETES MELLITUS WITHOUT COMPLICATION, WITHOUT LONG-TERM CURRENT USE OF INSULIN (HCC): ICD-10-CM

## 2023-07-24 LAB — HBA1C MFR BLD: 6.9 %

## 2023-07-24 RX ORDER — SILDENAFIL 50 MG/1
50 TABLET, FILM COATED ORAL DAILY PRN
Qty: 30 TABLET | Refills: 1 | Status: SHIPPED | OUTPATIENT
Start: 2023-07-24

## 2023-07-24 ASSESSMENT — LIFESTYLE VARIABLES
HOW OFTEN DO YOU HAVE A DRINK CONTAINING ALCOHOL: MONTHLY OR LESS
HOW MANY STANDARD DRINKS CONTAINING ALCOHOL DO YOU HAVE ON A TYPICAL DAY: 1 OR 2

## 2023-07-24 ASSESSMENT — PATIENT HEALTH QUESTIONNAIRE - PHQ9
SUM OF ALL RESPONSES TO PHQ QUESTIONS 1-9: 0
2. FEELING DOWN, DEPRESSED OR HOPELESS: 0
1. LITTLE INTEREST OR PLEASURE IN DOING THINGS: 0
SUM OF ALL RESPONSES TO PHQ9 QUESTIONS 1 & 2: 0
SUM OF ALL RESPONSES TO PHQ QUESTIONS 1-9: 0

## 2023-08-10 ENCOUNTER — HOSPITAL ENCOUNTER (OUTPATIENT)
Facility: HOSPITAL | Age: 68
Discharge: HOME | End: 2023-08-10
Payer: MEDICARE

## 2023-08-10 ENCOUNTER — HOSPITAL ENCOUNTER (OUTPATIENT)
Facility: HOSPITAL | Age: 68
End: 2023-08-10
Payer: MEDICARE

## 2023-08-10 DIAGNOSIS — Z87.891 PERSONAL HISTORY OF TOBACCO USE: ICD-10-CM

## 2023-08-10 DIAGNOSIS — Z13.6 SCREENING FOR AAA (ABDOMINAL AORTIC ANEURYSM): ICD-10-CM

## 2023-08-10 PROCEDURE — 76706 US ABDL AORTA SCREEN AAA: CPT

## 2023-08-10 PROCEDURE — 71271 CT THORAX LUNG CANCER SCR C-: CPT

## 2023-08-15 ENCOUNTER — TELEPHONE (OUTPATIENT)
Facility: CLINIC | Age: 68
End: 2023-08-15

## 2023-08-15 NOTE — TELEPHONE ENCOUNTER
Called patient to speak about message from . Feliciano Zarate, NPConfirmed name and date of birth. Spoke with patient about message. Patient expressed understanding.

## 2023-08-15 NOTE — TELEPHONE ENCOUNTER
[facility],     Please inform Mr. Self his ultrasound results were completely normal.     Respectfully,     Breana David NP

## 2023-08-17 ENCOUNTER — TELEPHONE (OUTPATIENT)
Facility: CLINIC | Age: 68
End: 2023-08-17

## 2023-08-17 NOTE — TELEPHONE ENCOUNTER
Spoke with patient on 8/17/2023 of CT scan of the lung and AAA scan. Patient understood had no further questions at this time.

## 2023-08-29 ENCOUNTER — OFFICE VISIT (OUTPATIENT)
Facility: CLINIC | Age: 68
End: 2023-08-29
Payer: MEDICARE

## 2023-08-29 ENCOUNTER — APPOINTMENT (OUTPATIENT)
Age: 68
End: 2023-08-29
Attending: EMERGENCY MEDICINE
Payer: MEDICARE

## 2023-08-29 ENCOUNTER — HOSPITAL ENCOUNTER (EMERGENCY)
Age: 68
Discharge: HOME | End: 2023-08-29
Attending: EMERGENCY MEDICINE
Payer: MEDICARE

## 2023-08-29 VITALS
HEART RATE: 61 BPM | BODY MASS INDEX: 40.37 KG/M2 | SYSTOLIC BLOOD PRESSURE: 154 MMHG | OXYGEN SATURATION: 97 % | TEMPERATURE: 97.7 F | RESPIRATION RATE: 19 BRPM | WEIGHT: 282 LBS | HEIGHT: 70 IN | DIASTOLIC BLOOD PRESSURE: 91 MMHG

## 2023-08-29 VITALS
RESPIRATION RATE: 18 BRPM | SYSTOLIC BLOOD PRESSURE: 134 MMHG | BODY MASS INDEX: 39.48 KG/M2 | HEART RATE: 65 BPM | TEMPERATURE: 98.3 F | DIASTOLIC BLOOD PRESSURE: 79 MMHG | WEIGHT: 282 LBS | HEIGHT: 71 IN | OXYGEN SATURATION: 96 %

## 2023-08-29 DIAGNOSIS — M79.89 LEG SWELLING: Primary | ICD-10-CM

## 2023-08-29 DIAGNOSIS — R60.0 LEG EDEMA, LEFT: Primary | ICD-10-CM

## 2023-08-29 LAB — ECHO BSA: 2.51 M2

## 2023-08-29 PROCEDURE — G8428 CUR MEDS NOT DOCUMENT: HCPCS | Performed by: NURSE PRACTITIONER

## 2023-08-29 PROCEDURE — 1036F TOBACCO NON-USER: CPT | Performed by: NURSE PRACTITIONER

## 2023-08-29 PROCEDURE — 99284 EMERGENCY DEPT VISIT MOD MDM: CPT

## 2023-08-29 PROCEDURE — 3078F DIAST BP <80 MM HG: CPT | Performed by: NURSE PRACTITIONER

## 2023-08-29 PROCEDURE — 99215 OFFICE O/P EST HI 40 MIN: CPT | Performed by: NURSE PRACTITIONER

## 2023-08-29 PROCEDURE — 3017F COLORECTAL CA SCREEN DOC REV: CPT | Performed by: NURSE PRACTITIONER

## 2023-08-29 PROCEDURE — 3075F SYST BP GE 130 - 139MM HG: CPT | Performed by: NURSE PRACTITIONER

## 2023-08-29 PROCEDURE — 1123F ACP DISCUSS/DSCN MKR DOCD: CPT | Performed by: NURSE PRACTITIONER

## 2023-08-29 PROCEDURE — G8417 CALC BMI ABV UP PARAM F/U: HCPCS | Performed by: NURSE PRACTITIONER

## 2023-08-29 PROCEDURE — 93971 EXTREMITY STUDY: CPT

## 2023-08-29 ASSESSMENT — PAIN - FUNCTIONAL ASSESSMENT: PAIN_FUNCTIONAL_ASSESSMENT: NONE - DENIES PAIN

## 2023-08-29 ASSESSMENT — LIFESTYLE VARIABLES
HOW MANY STANDARD DRINKS CONTAINING ALCOHOL DO YOU HAVE ON A TYPICAL DAY: 1 OR 2
HOW OFTEN DO YOU HAVE A DRINK CONTAINING ALCOHOL: 2-4 TIMES A MONTH

## 2023-08-29 NOTE — PROGRESS NOTES
History of Present Illness  Delonte Estevez is a 74 y.o. male who presents today for:    Chief Complaint   Patient presents with    Leg Injury     Pt reports he hit his left leg last week while cutting grass, pt reports he did notice his leg was hurting but nonthing major but last night his leg was swollen, red, and hot to touch. Pt reports he also has notice bruising on the inside of left foot. Past Medical History  Past Medical History:   Diagnosis Date    Atherosclerosis of right carotid artery     Cerebral atherosclerosis     Hyperlipidemia     Hypertension     Non-compliance     Stroke [facility])         Surgical History  Past Surgical History:   Procedure Laterality Date    CAROTID STENT PLACEMENT  12/82021        Current Medications  Current Outpatient Medications   Medication Sig    Multiple Vitamin (MULTI-VITAMINS PO) Take by mouth    sildenafil (VIAGRA) 50 MG tablet Take 1 tablet by mouth daily as needed for Erectile Dysfunction    glipiZIDE (GLUCOTROL) 5 MG tablet Take 1 tablet by mouth 2 times daily    clopidogrel (PLAVIX) 75 MG tablet Take 1 tablet by mouth daily    empagliflozin (JARDIANCE) 25 MG tablet Take 1 tablet by mouth daily    metFORMIN (GLUCOPHAGE-XR) 500 MG extended release tablet TAKE 2 TABLETS BY MOUTH ONCE DAILY WITH BREAKFAST    losartan-hydroCHLOROthiazide (HYZAAR) 100-12.5 MG per tablet Take 1 tablet by mouth daily    JANUVIA 100 MG tablet Take 1 tablet by mouth once daily    atorvastatin (LIPITOR) 80 MG tablet Take 1 tablet by mouth once daily    aspirin 81 MG EC tablet Take 325 mg by mouth daily    vitamin D (CHOLECALCIFEROL) 25 MCG (1000 UT) TABS tablet Take by mouth daily     No current facility-administered medications for this visit.        Allergies/Drug Reactions  No Known Allergies     Family History  Family History   Problem Relation Age of Onset    Heart Disease Father     Osteoarthritis Mother     Heart Disease Brother         Social History  Social History

## 2023-08-29 NOTE — ED TRIAGE NOTES
Pt reports he bumped his left leg last week, reports he noticed slight swelling and redness several days later. Reports that last night he noticed bruising to foot near the inner ankle/heel, reports swelling was double yesterday.

## 2023-08-29 NOTE — PROGRESS NOTES
Mannie Bock. presents today for   Chief Complaint   Patient presents with    Leg Injury     Pt reports he hit his leg last week while cutting grass, pt reports he did notice his leg was hurting but nonthing major but last night his leg was swollen, red, and hot to touch. Pt reports he also has notice bruising under his ankle. Is someone accompanying this pt? Yes, wife     Is the patient using any DME equipment during OV? no    Health Maintenance reviewed and discussed and ordered per Provider. Health Maintenance Due   Topic Date Due    COVID-19 Vaccine (1) Never done    Pneumococcal 65+ years Vaccine (1 - PCV) Never done    HIV screen  Never done    Diabetic retinal exam  Never done    DTaP/Tdap/Td vaccine (1 - Tdap) Never done    Colorectal Cancer Screen  Never done    Shingles vaccine (1 of 2) Never done    Diabetic foot exam  10/29/2021    Flu vaccine (1) 08/01/2023   . Coordination of Care:  1. \"Have you been to the ER, urgent care clinic since your last visit? Hospitalized since your last visit? \" No    2. \"Have you seen or consulted any other health care providers outside of the [address] [place] [address] since your last visit? \" No    3. For patients aged 43-73: Has the patient had a colonoscopy? No    If the patient is female:    4. For patients aged 43-66: Has the patient had a mammogram within the past 2 years? N/A based on age/sex    5. For patients aged 21-65: Has the patient had a pap smear?  N/A based on age/sex

## 2023-09-01 ENCOUNTER — OFFICE VISIT (OUTPATIENT)
Facility: CLINIC | Age: 68
End: 2023-09-01
Payer: MEDICARE

## 2023-09-01 VITALS
SYSTOLIC BLOOD PRESSURE: 138 MMHG | HEIGHT: 70 IN | DIASTOLIC BLOOD PRESSURE: 76 MMHG | HEART RATE: 71 BPM | TEMPERATURE: 97.3 F | WEIGHT: 282.4 LBS | BODY MASS INDEX: 40.43 KG/M2 | OXYGEN SATURATION: 96 %

## 2023-09-01 DIAGNOSIS — E78.2 MIXED HYPERLIPIDEMIA: ICD-10-CM

## 2023-09-01 DIAGNOSIS — L03.116 LEFT LEG CELLULITIS: Primary | ICD-10-CM

## 2023-09-01 DIAGNOSIS — I10 PRIMARY HYPERTENSION: ICD-10-CM

## 2023-09-01 PROCEDURE — 1123F ACP DISCUSS/DSCN MKR DOCD: CPT | Performed by: NURSE PRACTITIONER

## 2023-09-01 PROCEDURE — G8417 CALC BMI ABV UP PARAM F/U: HCPCS | Performed by: NURSE PRACTITIONER

## 2023-09-01 PROCEDURE — G8427 DOCREV CUR MEDS BY ELIG CLIN: HCPCS | Performed by: NURSE PRACTITIONER

## 2023-09-01 PROCEDURE — 99214 OFFICE O/P EST MOD 30 MIN: CPT | Performed by: NURSE PRACTITIONER

## 2023-09-01 PROCEDURE — 1036F TOBACCO NON-USER: CPT | Performed by: NURSE PRACTITIONER

## 2023-09-01 PROCEDURE — 3017F COLORECTAL CA SCREEN DOC REV: CPT | Performed by: NURSE PRACTITIONER

## 2023-09-01 PROCEDURE — 3078F DIAST BP <80 MM HG: CPT | Performed by: NURSE PRACTITIONER

## 2023-09-01 PROCEDURE — 3075F SYST BP GE 130 - 139MM HG: CPT | Performed by: NURSE PRACTITIONER

## 2023-09-01 RX ORDER — SULFAMETHOXAZOLE AND TRIMETHOPRIM 800; 160 MG/1; MG/1
2 TABLET ORAL 2 TIMES DAILY
Qty: 28 TABLET | Refills: 0 | Status: SHIPPED | OUTPATIENT
Start: 2023-09-01 | End: 2023-09-08

## 2023-09-01 ASSESSMENT — PATIENT HEALTH QUESTIONNAIRE - PHQ9
SUM OF ALL RESPONSES TO PHQ QUESTIONS 1-9: 0
SUM OF ALL RESPONSES TO PHQ QUESTIONS 1-9: 0
SUM OF ALL RESPONSES TO PHQ9 QUESTIONS 1 & 2: 0
SUM OF ALL RESPONSES TO PHQ QUESTIONS 1-9: 0
SUM OF ALL RESPONSES TO PHQ QUESTIONS 1-9: 0
1. LITTLE INTEREST OR PLEASURE IN DOING THINGS: 0
2. FEELING DOWN, DEPRESSED OR HOPELESS: 0

## 2023-09-01 NOTE — PROGRESS NOTES
Justina Harding. presents today for   Chief Complaint   Patient presents with    ED Follow-up     He was sent to ER for leg Edema        Is someone accompanying this pt? Yes his wife   Is the patient using any DME equipment during [address]? No     Health Maintenance reviewed and discussed and ordered per Provider. Health Maintenance Due   Topic Date Due    COVID-19 Vaccine (1) Never done    Pneumococcal 65+ years Vaccine (1 - PCV) Never done    HIV screen  Never done    Diabetic retinal exam  Never done    DTaP/Tdap/Td vaccine (1 - Tdap) Never done    Colorectal Cancer Screen  Never done    Shingles vaccine (1 of 2) Never done    Diabetic foot exam  10/29/2021    Flu vaccine (1) 08/01/2023   . Coordination of Care:  1. \"Have you been to the ER, urgent care clinic since your last visit? Hospitalized since your last visit? \" ER [facility] for leg edema     2. \"Have you seen or consulted any other health care providers outside of the [address] [place] [address] since your last visit? \" No    3. For patients aged 43-73: Has the patient had a colonoscopy?  No

## 2023-09-01 NOTE — PROGRESS NOTES
History of Present Illness  Alison Hollins is a 74 y.o. male who presents today for:    Chief Complaint   Patient presents with    ED Follow-up     He was sent to ER for leg Edema        Past Medical History  Past Medical History:   Diagnosis Date    Atherosclerosis of right carotid artery     Cerebral atherosclerosis     Hyperlipidemia     Hypertension     Non-compliance     Stroke [facility])         Surgical History  Past Surgical History:   Procedure Laterality Date    CAROTID STENT PLACEMENT  12/82021        Current Medications  Current Outpatient Medications   Medication Sig    Multiple Vitamin (MULTI-VITAMINS PO) Take by mouth    sildenafil (VIAGRA) 50 MG tablet Take 1 tablet by mouth daily as needed for Erectile Dysfunction    glipiZIDE (GLUCOTROL) 5 MG tablet Take 1 tablet by mouth 2 times daily    clopidogrel (PLAVIX) 75 MG tablet Take 1 tablet by mouth daily    empagliflozin (JARDIANCE) 25 MG tablet Take 1 tablet by mouth daily    metFORMIN (GLUCOPHAGE-XR) 500 MG extended release tablet TAKE 2 TABLETS BY MOUTH ONCE DAILY WITH BREAKFAST    losartan-hydroCHLOROthiazide (HYZAAR) 100-12.5 MG per tablet Take 1 tablet by mouth daily    JANUVIA 100 MG tablet Take 1 tablet by mouth once daily    atorvastatin (LIPITOR) 80 MG tablet Take 1 tablet by mouth once daily    aspirin 81 MG EC tablet Take 325 mg by mouth daily    vitamin D (CHOLECALCIFEROL) 25 MCG (1000 UT) TABS tablet Take by mouth daily     No current facility-administered medications for this visit.        Allergies/Drug Reactions  No Known Allergies     Family History  Family History   Problem Relation Age of Onset    Heart Disease Father     Osteoarthritis Mother     Heart Disease Brother         Social History  Social History     Tobacco Use    Smoking status: Former     Packs/day: 0.25     Years: 30.00     Pack years: 7.50     Types: Cigarettes, Cigars, Pipe     Quit date: 2/1/2020     Years since quitting: 3.5    Smokeless tobacco: Former

## 2023-09-04 NOTE — ED PROVIDER NOTES
[facility] EMERGENCY DEPT  EMERGENCY DEPARTMENT ENCOUNTER       Pt Name: Tramaine Walls MRN: [id number]  Birthdate 1955  Date of evaluation: 8/29/2023  Provider: Brennen Contreras MD   PCP: IVETH Grace NP  Note Started: 7:20 AM 9/4/23     CHIEF COMPLAINT       Chief Complaint   Patient presents with    Leg Swelling     Left lower leg        HISTORY OF PRESENT ILLNESS: 1 or more elements      History From: Patient  History limited by: Nothing     Tramaine Walls is a 74 y.o. male who presents to ED, he complains of bump to his left leg a week ago. He has now noticed slight swelling and redness after several days. He also reports last night he noticed bruising to foot near the inner aspect of the ankle. Patient anxious may have a blood clot. Patient seen by her PCP and sent to ED for PVLs. Nursing Notes were all reviewed and agreed with or any disagreements were addressed in the HPI. REVIEW OF SYSTEMS      Review of Systems     Positives and Pertinent negatives as per HPI.     PAST HISTORY     Past Medical History:  Past Medical History:   Diagnosis Date    Atherosclerosis of right carotid artery     Cerebral atherosclerosis     Hyperlipidemia     Hypertension     Non-compliance     Stroke [facility])        Past Surgical History:  Past Surgical History:   Procedure Laterality Date    CAROTID STENT PLACEMENT  12/82021       Family History:  Family History   Problem Relation Age of Onset    Heart Disease Father     Osteoarthritis Mother     Heart Disease Brother        Social History:  Social History     Tobacco Use    Smoking status: Former     Packs/day: 0.25     Years: 30.00     Pack years: 7.50     Types: Cigarettes, Cigars, Pipe     Quit date: 2/1/2020     Years since quitting: 3.5    Smokeless tobacco: Former   Vaping Use    Vaping Use: Never used   Substance Use Topics    Alcohol use: Yes    Drug use: Not Currently       Allergies:  No Known Allergies    CURRENT

## 2023-10-06 RX ORDER — CLOPIDOGREL BISULFATE 75 MG/1
75 TABLET ORAL DAILY
Qty: 90 TABLET | Refills: 0 | Status: SHIPPED | OUTPATIENT
Start: 2023-10-06

## 2023-10-06 RX ORDER — EMPAGLIFLOZIN 25 MG/1
25 TABLET, FILM COATED ORAL DAILY
Qty: 90 TABLET | Refills: 0 | Status: SHIPPED | OUTPATIENT
Start: 2023-10-06

## 2023-11-20 ENCOUNTER — NURSE ONLY (OUTPATIENT)
Facility: CLINIC | Age: 68
End: 2023-11-20
Payer: MEDICARE

## 2023-11-20 DIAGNOSIS — Z23 ENCOUNTER FOR IMMUNIZATION: Primary | ICD-10-CM

## 2023-11-20 PROCEDURE — 90694 VACC AIIV4 NO PRSRV 0.5ML IM: CPT | Performed by: NURSE PRACTITIONER

## 2023-11-20 PROCEDURE — G0008 ADMIN INFLUENZA VIRUS VAC: HCPCS | Performed by: NURSE PRACTITIONER

## 2023-11-20 PROCEDURE — 99211 OFF/OP EST MAY X REQ PHY/QHP: CPT | Performed by: NURSE PRACTITIONER

## 2023-11-20 NOTE — PROGRESS NOTES
Vaibhav House is a 74 y.o. male who presents for routine immunizations. He denies any symptoms , reactions or allergies that would exclude them from being immunized today. Risks and adverse reactions were discussed and the VIS was given to them. All questions were addressed. He refused to stay in the office for observation, was in no distress when they left.

## 2024-01-24 ENCOUNTER — OFFICE VISIT (OUTPATIENT)
Facility: CLINIC | Age: 69
End: 2024-01-24

## 2024-01-24 VITALS
WEIGHT: 287.2 LBS | HEIGHT: 70 IN | RESPIRATION RATE: 18 BRPM | TEMPERATURE: 98 F | HEART RATE: 79 BPM | DIASTOLIC BLOOD PRESSURE: 85 MMHG | BODY MASS INDEX: 41.12 KG/M2 | OXYGEN SATURATION: 96 % | SYSTOLIC BLOOD PRESSURE: 137 MMHG

## 2024-01-24 DIAGNOSIS — E78.2 MIXED HYPERLIPIDEMIA: ICD-10-CM

## 2024-01-24 DIAGNOSIS — Z95.828 HISTORY OF RIGHT COMMON CAROTID ARTERY STENT PLACEMENT: ICD-10-CM

## 2024-01-24 DIAGNOSIS — I10 ESSENTIAL (PRIMARY) HYPERTENSION: ICD-10-CM

## 2024-01-24 DIAGNOSIS — I67.2 CEREBRAL ATHEROSCLEROSIS: ICD-10-CM

## 2024-01-24 DIAGNOSIS — E66.01 OBESITY, CLASS III, BMI 40-49.9 (MORBID OBESITY) (HCC): ICD-10-CM

## 2024-01-24 DIAGNOSIS — R68.89 OTHER GENERAL SYMPTOMS AND SIGNS: ICD-10-CM

## 2024-01-24 DIAGNOSIS — Z12.11 ENCOUNTER FOR COLORECTAL CANCER SCREENING: ICD-10-CM

## 2024-01-24 DIAGNOSIS — Z98.890 HISTORY OF RIGHT COMMON CAROTID ARTERY STENT PLACEMENT: ICD-10-CM

## 2024-01-24 DIAGNOSIS — E11.65 TYPE 2 DIABETES MELLITUS WITH HYPERGLYCEMIA, WITHOUT LONG-TERM CURRENT USE OF INSULIN (HCC): Primary | ICD-10-CM

## 2024-01-24 DIAGNOSIS — Z12.5 ENCOUNTER FOR SCREENING FOR MALIGNANT NEOPLASM OF PROSTATE: ICD-10-CM

## 2024-01-24 DIAGNOSIS — Z12.12 ENCOUNTER FOR COLORECTAL CANCER SCREENING: ICD-10-CM

## 2024-01-24 DIAGNOSIS — H61.23 IMPACTED CERUMEN OF BOTH EARS: ICD-10-CM

## 2024-01-24 SDOH — ECONOMIC STABILITY: FOOD INSECURITY: WITHIN THE PAST 12 MONTHS, YOU WORRIED THAT YOUR FOOD WOULD RUN OUT BEFORE YOU GOT MONEY TO BUY MORE.: PATIENT DECLINED

## 2024-01-24 SDOH — ECONOMIC STABILITY: FOOD INSECURITY: WITHIN THE PAST 12 MONTHS, THE FOOD YOU BOUGHT JUST DIDN'T LAST AND YOU DIDN'T HAVE MONEY TO GET MORE.: PATIENT DECLINED

## 2024-01-24 SDOH — ECONOMIC STABILITY: INCOME INSECURITY: HOW HARD IS IT FOR YOU TO PAY FOR THE VERY BASICS LIKE FOOD, HOUSING, MEDICAL CARE, AND HEATING?: PATIENT DECLINED

## 2024-01-24 SDOH — ECONOMIC STABILITY: HOUSING INSECURITY
IN THE LAST 12 MONTHS, WAS THERE A TIME WHEN YOU DID NOT HAVE A STEADY PLACE TO SLEEP OR SLEPT IN A SHELTER (INCLUDING NOW)?: PATIENT DECLINED

## 2024-01-24 ASSESSMENT — PATIENT HEALTH QUESTIONNAIRE - PHQ9
2. FEELING DOWN, DEPRESSED OR HOPELESS: 0
SUM OF ALL RESPONSES TO PHQ9 QUESTIONS 1 & 2: 0
SUM OF ALL RESPONSES TO PHQ QUESTIONS 1-9: 0
1. LITTLE INTEREST OR PLEASURE IN DOING THINGS: 0

## 2024-01-24 NOTE — PROGRESS NOTES
Johnathon Self Jr. presents today for   Chief Complaint   Patient presents with    Follow-up     6m follow up        Is someone accompanying this pt? no    Is the patient using any DME equipment during OV? no    Health Maintenance reviewed and discussed and ordered per Provider.    Health Maintenance Due   Topic Date Due    COVID-19 Vaccine (1) Never done    Pneumococcal 65+ years Vaccine (1 - PCV) Never done    Diabetic retinal exam  Never done    DTaP/Tdap/Td vaccine (1 - Tdap) Never done    Colorectal Cancer Screen  Never done    Shingles vaccine (1 of 2) Never done    Respiratory Syncytial Virus (RSV) Pregnant or age 60 yrs+ (1 - 1-dose 60+ series) Never done    Diabetic foot exam  10/29/2021    Diabetic Alb to Cr ratio (uACR) test  01/23/2024    Lipids  01/23/2024    GFR test (Diabetes, CKD 3-4, OR last GFR 15-59)  01/23/2024   .      \"Have you been to the ER, urgent care clinic since your last visit?  Hospitalized since your last visit?\"    NO    “Have you seen or consulted any other health care providers outside of [facility] since your last visit?”    NO    “Have you had a colorectal cancer screening such as a colonoscopy/FIT/Cologuard?    NO           
Value Ref Range Status    Body Surface Area 08/29/2023 2.51  m2 Final     Patient reports he often forgets to take Glipizide 5 mg tablet daily.  He is prescribed Glipizide 5 mg tablet BID, but only takes daily.  Will change all daily medications to daily at supper at this visit.      Physical examination performed:  Bilateral ear tympanic unable to visualize due to cerumen impaction.  Subsequently, performed bilateral ear cerumen disimpaction with instrumentation and visualized tympanic membrane with no abnormalities.  Cardiac auscultation revealed no arrhythmias or murmurs.  Bilateral lung sounds clear to auscultation in all fields.  Abdomen soft and nontender, bowel sounds normoactive in all 4 quadrants.  There is no observed bilateral lower extremity edema.     Contact patient if laboratory results require change in treatment.     Assessment/Plan:    Type 2 diabetes mellitus with hyperglycemia, without long-term current use of insulin (formerly [facility]).  Continue Januvia 100 mg tablet daily, Jardiance 25 mg tablet daily, Metformin 500 mg extended release tablet, take 2 tablets daily and Glipizide 5 mg tablet daily with evening meal for management of Type 2 diabetes mellitus with hyperglycemia, without long-term current use of insulin (formerly [facility]).  Patient will follow-up in 3 months for POC A1c.  Mixed hyperlipidemia.  Continue Atorvastatin 80 mg tablet every other day for management of mixed hyperlipidemia.  Impacted cerumen of both ears.  Successfully performed bilateral ear cerumen disimpaction with instrumentation at this visit.  Obesity, Class III, BMI 40-49.9 (morbid obesity) (formerly [facility]).  Patient advised to decrease caloric intake and increase activity level to lower risk associated with Obesity, Class III, BMI 40-49.9 (morbid obesity) (formerly [facility]).  Essential hypertension.  Continue Losartan/HCTZ 100/12.5 mg tablet daily for management of essential hypertension.  History of right common carotid artery stent placement and cerebral

## 2024-01-25 PROBLEM — Z98.890 HISTORY OF RIGHT COMMON CAROTID ARTERY STENT PLACEMENT: Status: ACTIVE | Noted: 2024-01-25

## 2024-01-25 PROBLEM — Z95.828 HISTORY OF RIGHT COMMON CAROTID ARTERY STENT PLACEMENT: Status: ACTIVE | Noted: 2024-01-25

## 2024-01-28 ENCOUNTER — TELEPHONE (OUTPATIENT)
Age: 69
End: 2024-01-28

## 2024-01-30 NOTE — TELEPHONE ENCOUNTER
Referral  Referral # [id number]  Referral Information    Referral # Creation Date Referral Status Status Update    [id number] 01/24/2024 In Progress 01/28/2024: Status History     Status Reason Referral Type Referral Reasons Referral Class   none Eval and Treat Specialty Services Required Internal     To Specialty To Provider To Location/Place of Service To Department   Gastroenterology Sanchez Phillips MD none HR [facility] - GASTROENTEROLOGY     To Vendor Referred By By Location/Place of Service By Department   none Onel Roth, APRN - NP [facility]     Priority Start Date Expiration Date Referral Entered By   Routine 01/24/2024 01/23/2025 Onel Roth, APRMARIE - NP     Visits Requested Visits Authorized Visits Completed Visits Scheduled   2 2       Coverages    Payor Plan Auth. Required? Covered? Member # Authorized From Expires Auth # Precert. # Comment   MEDICARE MEDICARE PART A AND B -- Covered [id number] 12/1/2022 -- -- -- --   BCBS [facility] -- Covered [id number] 1/8/2006 -- -- -- --     Referral Information    Referral # Creation Date Referral Status Status Update    [id number] 01/24/2024 In Progress 01/28/2024: Status History     Status Reason Referral Type Referral Reasons Referral Class   none Eval and Treat Specialty Services Required Internal     To Specialty To Provider To Location/Place of Service To Department   Gastroenterology Sanchez Phillips MD none [facility] - GASTROENTEROLOGY     To Vendor Referred By By Location/Place of Service By Department   none Onel Roth, APRMARIE - NP [facility]     Priority Start Date Expiration Date Referral Entered By   Routine 01/24/2024 01/23/2025 Onel Roth, APRMARIE - NP     Visits Requested Visits Authorized Visits Completed Visits Scheduled   2 2

## 2024-02-02 NOTE — PROGRESS NOTES
Peg prep given to the patient via telephone, emailed and mailed.  Procedure 2-, Dx Code: z12.11. Patient made aware to stop Plavix 5 days prior to procedure and do not take diabetic medications morning of procedure. Patient verbalized understanding. viviane Bridges

## 2024-02-06 ENCOUNTER — SCHEDULED TELEPHONE ENCOUNTER (OUTPATIENT)
Age: 69
End: 2024-02-06

## 2024-02-06 ENCOUNTER — PREP FOR PROCEDURE (OUTPATIENT)
Age: 69
End: 2024-02-06

## 2024-02-06 ENCOUNTER — TELEPHONE (OUTPATIENT)
Age: 69
End: 2024-02-06

## 2024-02-06 DIAGNOSIS — Z12.11 ENCOUNTER FOR SCREENING COLONOSCOPY: Primary | ICD-10-CM

## 2024-02-06 DIAGNOSIS — Z12.11 COLON CANCER SCREENING: Primary | ICD-10-CM

## 2024-02-06 RX ORDER — POLYETHYLENE GLYCOL 3350, SODIUM SULFATE ANHYDROUS, SODIUM BICARBONATE, SODIUM CHLORIDE, POTASSIUM CHLORIDE 236; 22.74; 6.74; 5.86; 2.97 G/4L; G/4L; G/4L; G/4L; G/4L
4 POWDER, FOR SOLUTION ORAL ONCE
Qty: 4000 ML | Refills: 0 | Status: SHIPPED | OUTPATIENT
Start: 2024-02-06 | End: 2024-02-06

## 2024-02-06 RX ORDER — SODIUM CHLORIDE, SODIUM LACTATE, POTASSIUM CHLORIDE, CALCIUM CHLORIDE 600; 310; 30; 20 MG/100ML; MG/100ML; MG/100ML; MG/100ML
INJECTION, SOLUTION INTRAVENOUS CONTINUOUS
Status: CANCELLED | OUTPATIENT
Start: 2024-02-06

## 2024-02-06 NOTE — H&P
Open access updated H&P.      Brief history: The patient is male White (non-) 68 y.o. who was referred for screening colonoscopy.  Review of the records showed that they had few if any health problems, excessive obesity, or significant medications that would require office evaluation prior to colonoscopy for colon cancer screening.  After review of their chart, contact was made with the patient in discussion and literature sent regarding the procedure and the bowel preparation.  They are here now for their procedure.    Past medical and surgical history:   Past Medical History:   Diagnosis Date    Atherosclerosis of right carotid artery     Cerebral atherosclerosis     Hyperlipidemia     Hypertension     Non-compliance     Stroke (HCC)       Past Surgical History:   Procedure Laterality Date    CAROTID STENT PLACEMENT  12/82021        Allergies:  No Known Allergies     Medications:  No current facility-administered medications for this encounter.    Current Outpatient Medications:     polyethylene glycol (GOLYTELY) 236 g solution, Take 4,000 mLs by mouth once for 1 dose, Disp: 4000 mL, Rfl: 0    clopidogrel (PLAVIX) 75 MG tablet, Take 1 tablet by mouth once daily, Disp: 90 tablet, Rfl: 0    JARDIANCE 25 MG tablet, Take 1 tablet by mouth once daily, Disp: 90 tablet, Rfl: 0    Multiple Vitamin (MULTI-VITAMINS PO), Take by mouth, Disp: , Rfl:     sildenafil (VIAGRA) 50 MG tablet, Take 1 tablet by mouth daily as needed for Erectile Dysfunction, Disp: 30 tablet, Rfl: 1    glipiZIDE (GLUCOTROL) 5 MG tablet, Take 1 tablet by mouth 2 times daily, Disp: , Rfl:     metFORMIN (GLUCOPHAGE-XR) 500 MG extended release tablet, TAKE 2 TABLETS BY MOUTH ONCE DAILY WITH BREAKFAST, Disp: 180 tablet, Rfl: 1    losartan-hydroCHLOROthiazide (HYZAAR) 100-12.5 MG per tablet, Take 1 tablet by mouth daily, Disp: 90 tablet, Rfl: 1    JANUVIA 100 MG tablet, Take 1 tablet by mouth once daily, Disp: 90 tablet, Rfl: 1    atorvastatin

## 2024-02-12 ENCOUNTER — ANESTHESIA EVENT (OUTPATIENT)
Age: 69
End: 2024-02-12
Payer: MEDICARE

## 2024-02-13 ENCOUNTER — HOSPITAL ENCOUNTER (OUTPATIENT)
Age: 69
Setting detail: OUTPATIENT SURGERY
Discharge: HOME | End: 2024-02-13
Attending: INTERNAL MEDICINE | Admitting: INTERNAL MEDICINE
Payer: MEDICARE

## 2024-02-13 ENCOUNTER — ANESTHESIA (OUTPATIENT)
Age: 69
End: 2024-02-13
Payer: MEDICARE

## 2024-02-13 VITALS
RESPIRATION RATE: 16 BRPM | HEIGHT: 70 IN | TEMPERATURE: 97 F | DIASTOLIC BLOOD PRESSURE: 80 MMHG | BODY MASS INDEX: 39.37 KG/M2 | WEIGHT: 275 LBS | OXYGEN SATURATION: 100 % | SYSTOLIC BLOOD PRESSURE: 162 MMHG | HEART RATE: 60 BPM

## 2024-02-13 DIAGNOSIS — Z12.11 COLON CANCER SCREENING: ICD-10-CM

## 2024-02-13 LAB
GLUCOSE BLD STRIP.AUTO-MCNC: 168 MG/DL (ref 70–110)
PERFORMED BY:: ABNORMAL

## 2024-02-13 PROCEDURE — 2709999900 HC NON-CHARGEABLE SUPPLY: Performed by: INTERNAL MEDICINE

## 2024-02-13 PROCEDURE — 45380 COLONOSCOPY AND BIOPSY: CPT | Performed by: INTERNAL MEDICINE

## 2024-02-13 PROCEDURE — 2580000003 HC RX 258: Performed by: INTERNAL MEDICINE

## 2024-02-13 PROCEDURE — 6360000002 HC RX W HCPCS: Performed by: NURSE ANESTHETIST, CERTIFIED REGISTERED

## 2024-02-13 PROCEDURE — 3700000001 HC ADD 15 MINUTES (ANESTHESIA): Performed by: INTERNAL MEDICINE

## 2024-02-13 PROCEDURE — 3600007502: Performed by: INTERNAL MEDICINE

## 2024-02-13 PROCEDURE — 3600007512: Performed by: INTERNAL MEDICINE

## 2024-02-13 PROCEDURE — 3700000000 HC ANESTHESIA ATTENDED CARE: Performed by: INTERNAL MEDICINE

## 2024-02-13 PROCEDURE — 7100000011 HC PHASE II RECOVERY - ADDTL 15 MIN: Performed by: INTERNAL MEDICINE

## 2024-02-13 PROCEDURE — 82962 GLUCOSE BLOOD TEST: CPT

## 2024-02-13 PROCEDURE — 88305 TISSUE EXAM BY PATHOLOGIST: CPT

## 2024-02-13 PROCEDURE — 7100000010 HC PHASE II RECOVERY - FIRST 15 MIN: Performed by: INTERNAL MEDICINE

## 2024-02-13 RX ORDER — SODIUM CHLORIDE 9 MG/ML
INJECTION, SOLUTION INTRAVENOUS PRN
Status: DISCONTINUED | OUTPATIENT
Start: 2024-02-13 | End: 2024-02-13 | Stop reason: HOSPADM

## 2024-02-13 RX ORDER — PROPOFOL 10 MG/ML
INJECTION, EMULSION INTRAVENOUS PRN
Status: DISCONTINUED | OUTPATIENT
Start: 2024-02-13 | End: 2024-02-13 | Stop reason: SDUPTHER

## 2024-02-13 RX ORDER — SODIUM CHLORIDE 0.9 % (FLUSH) 0.9 %
5-40 SYRINGE (ML) INJECTION EVERY 12 HOURS SCHEDULED
Status: DISCONTINUED | OUTPATIENT
Start: 2024-02-13 | End: 2024-02-13 | Stop reason: HOSPADM

## 2024-02-13 RX ORDER — SODIUM CHLORIDE, SODIUM LACTATE, POTASSIUM CHLORIDE, CALCIUM CHLORIDE 600; 310; 30; 20 MG/100ML; MG/100ML; MG/100ML; MG/100ML
INJECTION, SOLUTION INTRAVENOUS CONTINUOUS
Status: DISCONTINUED | OUTPATIENT
Start: 2024-02-13 | End: 2024-02-13 | Stop reason: HOSPADM

## 2024-02-13 RX ORDER — SODIUM CHLORIDE 0.9 % (FLUSH) 0.9 %
5-40 SYRINGE (ML) INJECTION PRN
Status: DISCONTINUED | OUTPATIENT
Start: 2024-02-13 | End: 2024-02-13 | Stop reason: HOSPADM

## 2024-02-13 RX ADMIN — PROPOFOL 160 MG: 10 INJECTION, EMULSION INTRAVENOUS at 10:20

## 2024-02-13 RX ADMIN — SODIUM CHLORIDE, POTASSIUM CHLORIDE, SODIUM LACTATE AND CALCIUM CHLORIDE: 600; 310; 30; 20 INJECTION, SOLUTION INTRAVENOUS at 09:26

## 2024-02-13 NOTE — ANESTHESIA PRE PROCEDURE
normal exam         Pulmonary:Negative Pulmonary ROS and normal exam                               Cardiovascular:Negative CV ROS  Exercise tolerance: good (>4 METS)  (+) hypertension:        Rhythm: regular  Rate: normal           Beta Blocker:  Dose within 24 Hrs         Neuro/Psych:   Negative Neuro/Psych ROS  (+) CVA: residual symptoms             ROS comment: Some left sided weakness GI/Hepatic/Renal: Neg GI/Hepatic/Renal ROS            Endo/Other: Negative Endo/Other ROS   (+) DiabetesType II DM.                 Abdominal:   (+) obese          Vascular: negative vascular ROS.         Other Findings:       Anesthesia Plan      MAC     ASA 3             Anesthetic plan and risks discussed with patient.      Plan discussed with surgical team.                IVETH Pascual - CRNA   2/13/2024

## 2024-02-13 NOTE — OP NOTE
Colonoscopy procedure note    Date of service: 2/13/2024    Type:  Screening    Indication for procedure: Colon cancer screening    Anesthesia classification: ASA class 2    Patient history and physical been accomplished and documented.  Patient is assessed and determined to be appropriate candidate for planned procedure and sedation; patient reassessed immediately prior to sedation.      Sedation plan: MAC per anesthesia    Surgical assistant: Not applicable    Airway assessment: Range of motion: Normal, mouth opening, Visual obstruction: No.    UPDATED PREOP EXAM:  Unchanged.    VS: Reviewed  Gen: in NAD  CV: RRR, no murmur  Resp: CTA  Abd: Soft, NTND, +BS  Extrem: No cyanosis or edema  Neuro: Awake and alert    Informed consent obtained: Yes.  The indications, risks including but not limited to bleeding, perforation, infection, death, and potential failure to visual areas are diagnosed neoplasia, alternatives and benefits were discussed with the patient prior to the procedure.  Patient identity and procedure was verified, absent was obtained, and is consistent with the consent form found in the patient's records.    PROCEDURE PERFORMED:  COLONOSCOPY  to the cecum with MAC and forcep polypectomy of small sessile cecal polyp.     INSTRUMENT: Olympus colonoscope per nursing notes.    FINDINGS:    External anal lesions: Normal   Rectum: normal.   Retroflexion view: Normal  Sigmoid: normal   Descending Colon: normal   Transverse Colon: normal   Ascending Colon: normal   Cecum: normal, including the appendiceal orifice and ileocecal valve.  There was a small less than 1 cm sessile polyp removed by forcep polypectomy without incident.  Terminal ileum: not evaluated     Specimens: 1.  Forcep polypectomy of small sessile cecal polyp.    Bowel preparation- adequate to detect small (5mm) polyps or larger.    Estimated blood loss: none   Complications:  none   Cecal withdrawal time: 6 minutes.    Comments:   While

## 2024-02-13 NOTE — ANESTHESIA POSTPROCEDURE EVALUATION
Department of Anesthesiology  Postprocedure Note    Patient: Johnathon Self Jr.  MRN: [id number]  YOB: 1955  Date of evaluation: 2/13/2024    Procedure Summary       Date: 02/13/24 Room / Location: [facility] ENDO 01 / [facility] ENDOSCOPY    Anesthesia Start: 1011 Anesthesia Stop: 1025    Procedure: COLONOSCOPY with polypectomy (Rectum) Diagnosis:       Special screening for malignant neoplasms, colon      (Special screening for malignant neoplasms, colon [Z12.11])    Surgeons: Sanchez Phillips MD Responsible Provider: Manohar So APRN - CRNA    Anesthesia Type: MAC ASA Status: 3            Anesthesia Type: MAC    Pj Phase I: Pj Score: 10    Pj Phase II:      Anesthesia Post Evaluation    Patient location during evaluation: PACU  Patient participation: complete - patient participated  Level of consciousness: awake  Pain score: 0  Airway patency: patent  Nausea & Vomiting: no nausea  Cardiovascular status: blood pressure returned to baseline  Respiratory status: acceptable  Hydration status: euvolemic  Pain management: adequate    No notable events documented.

## 2024-02-13 NOTE — INTERVAL H&P NOTE
Update History & Physical    The patient's History and Physical of February 13, 2024 was reviewed with the patient and I examined the patient. There was no change. The surgical site was confirmed by the patient and me.     Plan: The risks, benefits, expected outcome, and alternative to the recommended procedure have been discussed with the patient. Patient understands and wants to proceed with the procedure.     Electronically signed by Sanchez Phillips MD on 2/13/2024 at 9:57 AM

## 2024-02-14 RX ORDER — SITAGLIPTIN 100 MG/1
TABLET, FILM COATED ORAL
Qty: 90 TABLET | Refills: 1 | Status: SHIPPED | OUTPATIENT
Start: 2024-02-14

## 2024-02-19 ENCOUNTER — TELEPHONE (OUTPATIENT)
Age: 69
End: 2024-02-19

## 2024-02-19 NOTE — TELEPHONE ENCOUNTER
Patient identified by 2 patient identifiers. Patient made aware of Colonoscopy results per Dr. Phillips, Colonoscopy pathology results-benign adenomatous polyp(s). Suggest repeat colonoscopy in 5 years time.  Patient verbalized understanding. JESUS Bridges

## 2024-04-02 RX ORDER — METFORMIN HYDROCHLORIDE 500 MG/1
TABLET, EXTENDED RELEASE ORAL
Qty: 180 TABLET | Refills: 1 | Status: SHIPPED | OUTPATIENT
Start: 2024-04-02

## 2024-05-02 ENCOUNTER — HOSPITAL ENCOUNTER (OUTPATIENT)
Age: 69
Discharge: HOME | End: 2024-05-02
Payer: MEDICARE

## 2024-05-02 DIAGNOSIS — R68.89 OTHER GENERAL SYMPTOMS AND SIGNS: ICD-10-CM

## 2024-05-02 DIAGNOSIS — E11.65 TYPE 2 DIABETES MELLITUS WITH HYPERGLYCEMIA, WITHOUT LONG-TERM CURRENT USE OF INSULIN (HCC): ICD-10-CM

## 2024-05-02 DIAGNOSIS — Z12.5 ENCOUNTER FOR SCREENING FOR MALIGNANT NEOPLASM OF PROSTATE: ICD-10-CM

## 2024-05-02 DIAGNOSIS — E78.2 MIXED HYPERLIPIDEMIA: ICD-10-CM

## 2024-05-02 DIAGNOSIS — I10 ESSENTIAL (PRIMARY) HYPERTENSION: ICD-10-CM

## 2024-05-02 LAB
ALBUMIN SERPL-MCNC: 3.7 G/DL (ref 3.4–5)
ALBUMIN/GLOB SERPL: 1 (ref 0.8–1.7)
ALP SERPL-CCNC: 54 U/L (ref 45–117)
ALT SERPL-CCNC: 35 U/L (ref 16–61)
ANION GAP SERPL CALC-SCNC: 6 MMOL/L (ref 3–18)
AST SERPL W P-5'-P-CCNC: 23 U/L (ref 10–38)
BASOPHILS # BLD: 0.1 K/UL (ref 0–0.1)
BASOPHILS NFR BLD: 1 % (ref 0–2)
BILIRUB SERPL-MCNC: 1 MG/DL (ref 0.2–1)
BUN SERPL-MCNC: 16 MG/DL (ref 7–18)
BUN/CREAT SERPL: 15 (ref 12–20)
CA-I BLD-MCNC: 9.5 MG/DL (ref 8.5–10.1)
CHLORIDE SERPL-SCNC: 103 MMOL/L (ref 100–111)
CHOLEST SERPL-MCNC: 196 MG/DL
CO2 SERPL-SCNC: 29 MMOL/L (ref 21–32)
CREAT SERPL-MCNC: 1.1 MG/DL (ref 0.6–1.3)
CREAT UR-MCNC: 67 MG/DL (ref 30–125)
DIFFERENTIAL METHOD BLD: ABNORMAL
EOSINOPHIL # BLD: 0.3 K/UL (ref 0–0.4)
EOSINOPHIL NFR BLD: 3 % (ref 0–5)
ERYTHROCYTE [DISTWIDTH] IN BLOOD BY AUTOMATED COUNT: 13.6 % (ref 11.6–14.5)
EST. AVERAGE GLUCOSE BLD GHB EST-MCNC: 169 MG/DL
GLOBULIN SER CALC-MCNC: 3.7 G/DL (ref 2–4)
GLUCOSE SERPL-MCNC: 194 MG/DL (ref 74–99)
HBA1C MFR BLD: 7.5 % (ref 4.2–5.6)
HCT VFR BLD AUTO: 45.6 % (ref 36–48)
HDLC SERPL-MCNC: 43 MG/DL (ref 40–60)
HDLC SERPL: 4.6 (ref 0–5)
HGB BLD-MCNC: 15.2 G/DL (ref 13–16)
IMM GRANULOCYTES # BLD AUTO: 0.1 K/UL (ref 0–0.04)
IMM GRANULOCYTES NFR BLD AUTO: 1 % (ref 0–0.5)
LDLC SERPL CALC-MCNC: 109.8 MG/DL (ref 0–100)
LIPID PANEL: ABNORMAL
LYMPHOCYTES # BLD: 3.3 K/UL (ref 0.9–3.6)
LYMPHOCYTES NFR BLD: 33 % (ref 21–52)
MCH RBC QN AUTO: 31.1 PG (ref 24–34)
MCHC RBC AUTO-ENTMCNC: 33.3 G/DL (ref 31–37)
MCV RBC AUTO: 93.4 FL (ref 78–100)
MICROALBUMIN UR-MCNC: 4.59 MG/DL (ref 0–3)
MICROALBUMIN/CREAT UR-RTO: 69 MGMALB/GCRE (ref 0–30)
MONOCYTES # BLD: 0.9 K/UL (ref 0.05–1.2)
MONOCYTES NFR BLD: 9 % (ref 3–10)
NEUTS SEG # BLD: 5.3 K/UL (ref 1.8–8)
NEUTS SEG NFR BLD: 53 % (ref 40–73)
NRBC # BLD: 0 K/UL (ref 0–0.01)
NRBC BLD-RTO: 0 PER 100 WBC
PLATELET # BLD AUTO: 171 K/UL (ref 135–420)
PMV BLD AUTO: 11.1 FL (ref 9.2–11.8)
POTASSIUM SERPL-SCNC: 4.3 MMOL/L (ref 3.5–5.5)
PROT SERPL-MCNC: 7.4 G/DL (ref 6.4–8.2)
PSA SERPL-MCNC: 1 NG/ML (ref 0–4)
RBC # BLD AUTO: 4.88 M/UL (ref 4.35–5.65)
SODIUM SERPL-SCNC: 138 MMOL/L (ref 136–145)
TRIGL SERPL-MCNC: 216 MG/DL
TSH SERPL DL<=0.05 MIU/L-ACNC: 1.6 UIU/ML (ref 0.36–3.74)
VIT B12 SERPL-MCNC: 360 PG/ML (ref 211–911)
VLDLC SERPL CALC-MCNC: 43.2 MG/DL
WBC # BLD AUTO: 9.9 K/UL (ref 4.6–13.2)

## 2024-05-02 PROCEDURE — 82570 ASSAY OF URINE CREATININE: CPT

## 2024-05-02 PROCEDURE — 83036 HEMOGLOBIN GLYCOSYLATED A1C: CPT

## 2024-05-02 PROCEDURE — 84443 ASSAY THYROID STIM HORMONE: CPT

## 2024-05-02 PROCEDURE — G0103 PSA SCREENING: HCPCS

## 2024-05-02 PROCEDURE — 82607 VITAMIN B-12: CPT

## 2024-05-02 PROCEDURE — 85025 COMPLETE CBC W/AUTO DIFF WBC: CPT

## 2024-05-02 PROCEDURE — 80061 LIPID PANEL: CPT

## 2024-05-02 PROCEDURE — 36415 COLL VENOUS BLD VENIPUNCTURE: CPT

## 2024-05-02 PROCEDURE — 82043 UR ALBUMIN QUANTITATIVE: CPT

## 2024-05-02 PROCEDURE — 80053 COMPREHEN METABOLIC PANEL: CPT

## 2024-05-03 ENCOUNTER — TELEPHONE (OUTPATIENT)
Facility: CLINIC | Age: 69
End: 2024-05-03

## 2024-05-03 NOTE — TELEPHONE ENCOUNTER
Spoke with patient via phone call on 5/3/2024 of laboratory results.  A1c = 7.5 and patient ports he will decrease his carbohydrate intake.

## 2024-07-18 ENCOUNTER — OFFICE VISIT (OUTPATIENT)
Facility: CLINIC | Age: 69
End: 2024-07-18
Payer: MEDICARE

## 2024-07-18 VITALS
OXYGEN SATURATION: 93 % | HEIGHT: 70 IN | SYSTOLIC BLOOD PRESSURE: 135 MMHG | DIASTOLIC BLOOD PRESSURE: 79 MMHG | WEIGHT: 291.4 LBS | HEART RATE: 88 BPM | BODY MASS INDEX: 41.72 KG/M2 | TEMPERATURE: 98 F | RESPIRATION RATE: 16 BRPM

## 2024-07-18 DIAGNOSIS — E11.65 TYPE 2 DIABETES MELLITUS WITH HYPERGLYCEMIA, WITHOUT LONG-TERM CURRENT USE OF INSULIN (HCC): Primary | ICD-10-CM

## 2024-07-18 DIAGNOSIS — I10 ESSENTIAL (PRIMARY) HYPERTENSION: ICD-10-CM

## 2024-07-18 DIAGNOSIS — N52.9 ERECTILE DYSFUNCTION, UNSPECIFIED ERECTILE DYSFUNCTION TYPE: ICD-10-CM

## 2024-07-18 PROCEDURE — 3017F COLORECTAL CA SCREEN DOC REV: CPT | Performed by: NURSE PRACTITIONER

## 2024-07-18 PROCEDURE — 2022F DILAT RTA XM EVC RTNOPTHY: CPT | Performed by: NURSE PRACTITIONER

## 2024-07-18 PROCEDURE — 1036F TOBACCO NON-USER: CPT | Performed by: NURSE PRACTITIONER

## 2024-07-18 PROCEDURE — 1124F ACP DISCUSS-NO DSCNMKR DOCD: CPT | Performed by: NURSE PRACTITIONER

## 2024-07-18 PROCEDURE — 99214 OFFICE O/P EST MOD 30 MIN: CPT | Performed by: NURSE PRACTITIONER

## 2024-07-18 PROCEDURE — 3075F SYST BP GE 130 - 139MM HG: CPT | Performed by: NURSE PRACTITIONER

## 2024-07-18 PROCEDURE — G8427 DOCREV CUR MEDS BY ELIG CLIN: HCPCS | Performed by: NURSE PRACTITIONER

## 2024-07-18 PROCEDURE — G8417 CALC BMI ABV UP PARAM F/U: HCPCS | Performed by: NURSE PRACTITIONER

## 2024-07-18 PROCEDURE — 3051F HG A1C>EQUAL 7.0%<8.0%: CPT | Performed by: NURSE PRACTITIONER

## 2024-07-18 PROCEDURE — 3078F DIAST BP <80 MM HG: CPT | Performed by: NURSE PRACTITIONER

## 2024-07-18 RX ORDER — SILDENAFIL 50 MG/1
50 TABLET, FILM COATED ORAL DAILY PRN
Qty: 30 TABLET | Refills: 1 | Status: SHIPPED | OUTPATIENT
Start: 2024-07-18

## 2024-07-18 RX ORDER — SILDENAFIL 50 MG/1
50 TABLET, FILM COATED ORAL DAILY PRN
Qty: 30 TABLET | Refills: 1 | Status: SHIPPED | OUTPATIENT
Start: 2024-07-18 | End: 2024-07-18

## 2024-07-18 NOTE — PROGRESS NOTES
Chief Complaint   Patient presents with    Follow-up     3 month follow up        \"Have you been to the ER, urgent care clinic since your last visit?  Hospitalized since your last visit?\"    NO    “Have you seen or consulted any other health care providers outside of [facility] since your last visit?”    NO

## 2024-07-18 NOTE — PROGRESS NOTES
History of Present Illness  Johnathon Self Jr. is a 68 y.o. male who presents today for:    Chief Complaint   Patient presents with    Follow-up     3 month follow up        Past Medical History  Past Medical History:   Diagnosis Date    Atherosclerosis of right carotid artery     Cerebral atherosclerosis     Diabetes mellitus (HCC)     Hyperlipidemia     Hypertension     Non-compliance     Stroke (HCC)         Surgical History  Past Surgical History:   Procedure Laterality Date    CAROTID STENT PLACEMENT  12/82021    COLONOSCOPY N/A 2/13/2024    COLONOSCOPY with polypectomy performed by Sanchez Phillips MD at [facility] ENDOSCOPY        Current Medications  Current Outpatient Medications   Medication Sig    metFORMIN (GLUCOPHAGE-XR) 500 MG extended release tablet TAKE 2 TABLETS BY MOUTH ONCE DAILY WITH BREAKFAST    SITagliptin (JANUVIA) 100 MG tablet Take 1 tablet by mouth once daily    clopidogrel (PLAVIX) 75 MG tablet Take 1 tablet by mouth once daily    JARDIANCE 25 MG tablet Take 1 tablet by mouth once daily    Multiple Vitamin (MULTI-VITAMINS PO) Take by mouth    sildenafil (VIAGRA) 50 MG tablet Take 1 tablet by mouth daily as needed for Erectile Dysfunction    glipiZIDE (GLUCOTROL) 5 MG tablet Take 1 tablet by mouth 2 times daily    losartan-hydroCHLOROthiazide (HYZAAR) 100-12.5 MG per tablet Take 1 tablet by mouth daily    atorvastatin (LIPITOR) 80 MG tablet Take 1 tablet by mouth once daily (Patient taking differently: Every other day)    aspirin 81 MG EC tablet Take 325 mg by mouth daily    vitamin D (CHOLECALCIFEROL) 25 MCG (1000 UT) TABS tablet Take by mouth daily     No current facility-administered medications for this visit.       Allergies/Drug Reactions  No Known Allergies     Family History  Family History   Problem Relation Age of Onset    Heart Disease Father     Osteoarthritis Mother     Heart Disease Brother         Social History  Social History     Tobacco Use    Smoking status: Former

## 2024-08-09 RX ORDER — SITAGLIPTIN 100 MG/1
TABLET, FILM COATED ORAL
Qty: 90 TABLET | Refills: 1 | Status: SHIPPED | OUTPATIENT
Start: 2024-08-09

## 2024-08-20 RX ORDER — LOSARTAN POTASSIUM AND HYDROCHLOROTHIAZIDE 12.5; 1 MG/1; MG/1
1 TABLET ORAL DAILY
Qty: 90 TABLET | Refills: 1 | Status: SHIPPED | OUTPATIENT
Start: 2024-08-20

## 2024-09-26 RX ORDER — METFORMIN HCL 500 MG
TABLET, EXTENDED RELEASE 24 HR ORAL
Qty: 180 TABLET | Refills: 1 | Status: SHIPPED | OUTPATIENT
Start: 2024-09-26

## 2024-10-18 ENCOUNTER — HOSPITAL ENCOUNTER (OUTPATIENT)
Age: 69
Setting detail: SPECIMEN
Discharge: HOME | End: 2024-10-21
Payer: MEDICARE

## 2024-10-18 DIAGNOSIS — E11.65 TYPE 2 DIABETES MELLITUS WITH HYPERGLYCEMIA, WITHOUT LONG-TERM CURRENT USE OF INSULIN (HCC): ICD-10-CM

## 2024-10-18 PROCEDURE — 36415 COLL VENOUS BLD VENIPUNCTURE: CPT

## 2024-10-18 PROCEDURE — 83036 HEMOGLOBIN GLYCOSYLATED A1C: CPT

## 2024-10-19 LAB
EST. AVERAGE GLUCOSE BLD GHB EST-MCNC: 200 MG/DL
HBA1C MFR BLD: 8.6 % (ref 4.2–5.6)

## 2024-10-20 ENCOUNTER — TELEPHONE (OUTPATIENT)
Facility: CLINIC | Age: 69
End: 2024-10-20

## 2024-11-11 RX ORDER — EMPAGLIFLOZIN 25 MG/1
25 TABLET, FILM COATED ORAL DAILY
Qty: 90 TABLET | Refills: 1 | Status: SHIPPED | OUTPATIENT
Start: 2024-11-11

## 2024-11-25 RX ORDER — CLOPIDOGREL BISULFATE 75 MG/1
75 TABLET ORAL DAILY
Qty: 90 TABLET | Refills: 3 | Status: SHIPPED | OUTPATIENT
Start: 2024-11-25

## 2024-12-12 RX ORDER — ATORVASTATIN CALCIUM 80 MG/1
TABLET, FILM COATED ORAL
Qty: 90 TABLET | Refills: 0 | Status: SHIPPED | OUTPATIENT
Start: 2024-12-12

## 2025-01-10 ENCOUNTER — NURSE ONLY (OUTPATIENT)
Facility: CLINIC | Age: 70
End: 2025-01-10

## 2025-01-10 DIAGNOSIS — Z23 ENCOUNTER FOR IMMUNIZATION: Primary | ICD-10-CM

## 2025-01-10 NOTE — PROGRESS NOTES
Johnathon Self Jr. is a 69 y.o. male who presents for routine immunizations.   He denies any symptoms , reactions or allergies that would exclude them from being immunized today.  Risks and adverse reactions were discussed and the VIS was given to them. All questions were addressed.  He refused to stay in the office for observation, was in no distress when they left.

## 2025-01-13 DIAGNOSIS — E11.9 TYPE 2 DIABETES MELLITUS WITHOUT COMPLICATION, WITHOUT LONG-TERM CURRENT USE OF INSULIN (HCC): ICD-10-CM

## 2025-01-13 RX ORDER — GLIPIZIDE 5 MG/1
5 TABLET ORAL 2 TIMES DAILY
Qty: 180 TABLET | Refills: 0 | Status: SHIPPED | OUTPATIENT
Start: 2025-01-13

## 2025-02-06 RX ORDER — SITAGLIPTIN 100 MG/1
TABLET, FILM COATED ORAL
Qty: 90 TABLET | Refills: 0 | Status: SHIPPED | OUTPATIENT
Start: 2025-02-06

## 2025-03-11 ENCOUNTER — OFFICE VISIT (OUTPATIENT)
Facility: CLINIC | Age: 70
End: 2025-03-11
Payer: MEDICARE

## 2025-03-11 VITALS
WEIGHT: 295 LBS | DIASTOLIC BLOOD PRESSURE: 82 MMHG | BODY MASS INDEX: 42.23 KG/M2 | SYSTOLIC BLOOD PRESSURE: 170 MMHG | TEMPERATURE: 97.3 F | HEART RATE: 67 BPM | RESPIRATION RATE: 18 BRPM | HEIGHT: 70 IN | OXYGEN SATURATION: 96 %

## 2025-03-11 DIAGNOSIS — Z00.00 INITIAL MEDICARE ANNUAL WELLNESS VISIT: Primary | ICD-10-CM

## 2025-03-11 DIAGNOSIS — E11.9 TYPE 2 DIABETES MELLITUS WITHOUT COMPLICATION, WITHOUT LONG-TERM CURRENT USE OF INSULIN (HCC): ICD-10-CM

## 2025-03-11 DIAGNOSIS — I10 PRIMARY HYPERTENSION: ICD-10-CM

## 2025-03-11 DIAGNOSIS — E78.2 MIXED HYPERLIPIDEMIA: ICD-10-CM

## 2025-03-11 LAB — HBA1C MFR BLD: 8.1 %

## 2025-03-11 PROCEDURE — 3052F HG A1C>EQUAL 8.0%<EQUAL 9.0%: CPT | Performed by: NURSE PRACTITIONER

## 2025-03-11 PROCEDURE — 83036 HEMOGLOBIN GLYCOSYLATED A1C: CPT | Performed by: NURSE PRACTITIONER

## 2025-03-11 PROCEDURE — 3079F DIAST BP 80-89 MM HG: CPT | Performed by: NURSE PRACTITIONER

## 2025-03-11 PROCEDURE — 3017F COLORECTAL CA SCREEN DOC REV: CPT | Performed by: NURSE PRACTITIONER

## 2025-03-11 PROCEDURE — 3077F SYST BP >= 140 MM HG: CPT | Performed by: NURSE PRACTITIONER

## 2025-03-11 PROCEDURE — G0439 PPPS, SUBSEQ VISIT: HCPCS | Performed by: NURSE PRACTITIONER

## 2025-03-11 PROCEDURE — 99214 OFFICE O/P EST MOD 30 MIN: CPT | Performed by: NURSE PRACTITIONER

## 2025-03-11 PROCEDURE — 1124F ACP DISCUSS-NO DSCNMKR DOCD: CPT | Performed by: NURSE PRACTITIONER

## 2025-03-11 PROCEDURE — 3046F HEMOGLOBIN A1C LEVEL >9.0%: CPT | Performed by: NURSE PRACTITIONER

## 2025-03-11 SDOH — ECONOMIC STABILITY: FOOD INSECURITY: WITHIN THE PAST 12 MONTHS, THE FOOD YOU BOUGHT JUST DIDN'T LAST AND YOU DIDN'T HAVE MONEY TO GET MORE.: NEVER TRUE

## 2025-03-11 SDOH — ECONOMIC STABILITY: FOOD INSECURITY: WITHIN THE PAST 12 MONTHS, YOU WORRIED THAT YOUR FOOD WOULD RUN OUT BEFORE YOU GOT MONEY TO BUY MORE.: NEVER TRUE

## 2025-03-11 ASSESSMENT — PATIENT HEALTH QUESTIONNAIRE - PHQ9
SUM OF ALL RESPONSES TO PHQ QUESTIONS 1-9: 0
2. FEELING DOWN, DEPRESSED OR HOPELESS: NOT AT ALL
SUM OF ALL RESPONSES TO PHQ QUESTIONS 1-9: 0
SUM OF ALL RESPONSES TO PHQ QUESTIONS 1-9: 0
1. LITTLE INTEREST OR PLEASURE IN DOING THINGS: NOT AT ALL
SUM OF ALL RESPONSES TO PHQ QUESTIONS 1-9: 0

## 2025-03-11 NOTE — PROGRESS NOTES
Jhonathon Self Jr. presents today for   Chief Complaint   Patient presents with    Medicare AWV     Medicare wellness       Is someone accompanying this pt? yes    Is the patient using any DME equipment during OV? no    Risk Factor Screenings with Interventions     Fall Risk:  2 or more falls in past year?: no  Fall with injury in past year?: no    Alcohol:  Alcohol Use: Not At Risk (3/11/2025)    AUDIT-C     Frequency of Alcohol Consumption: Monthly or less     Average Number of Drinks: 1 or 2     Frequency of Binge Drinking: Never       Depression:  PHQ-2 Score: 0    Cognitive:  Clock Drawing Test (CDT): Normal  Words recalled: 3 Words Recalled  Total Score: 5  Total Score Interpretation: Normal Mini-Cog    Health Risk Assessment:     General  In general, how would you say your health is?: Good  In the past 7 days, have you experienced any of the following: New or Increased Pain, New or Increased Fatigue, Loneliness, Social Isolation, Stress or Anger?: No      Health Habits/Nutrition  On average, how many days per week do you engage in moderate to strenuous exercise (like a brisk walk)?: 0 days  On average, how many minutes do you engage in exercise at this level?: 0 min  Do you eat balanced/healthy meals regularly?: Yes  Have you seen the dentist within the past year?: (!) No  Body mass index is 42.33 kg/m².      Hearing/Vision  Have you had an eye exam within the past year?: (!) No  Do you have difficulty driving, watching TV, or doing any of your daily activities because of your eyesight?: No  Do you or your family notice any trouble with your hearing that hasn't been managed with hearing aids?: No      Safety  Do you have working smoke detectors?: Yes  Do you have any tripping hazards - loose or unsecured carpets or rugs?: No  Do you have non-slip mats or non-slip surfaces or shower bars or grab bars in your shower or bathtub?: Yes  Do you always fasten your seatbelt when you are in a car?: 
Former     Current packs/day: 0.00     Average packs/day: 0.3 packs/day for 30.0 years (7.5 ttl pk-yrs)     Types: Cigarettes, Cigars, Pipe     Start date: 1990     Quit date: 2020     Years since quittin.1    Smokeless tobacco: Former   Vaping Use    Vaping status: Never Used   Substance Use Topics    Alcohol use: Yes    Drug use: Not Currently        Health Maintenance   Topic Date Due    Diabetic retinal exam  Never done    DTaP/Tdap/Td vaccine (1 - Tdap) Never done    Pneumococcal 50+ years Vaccine (1 of 2 - PCV) Never done    Shingles vaccine (1 of 2) Never done    Respiratory Syncytial Virus (RSV) Pregnant or age 60 yrs+ (1 - Risk 60-74 years 1-dose series) Never done    Diabetic foot exam  10/29/2021    Annual Wellness Visit (Medicare)  2024    COVID-19 Vaccine ( - - season) Never done    Depression Screen  2025    Diabetic Alb to Cr ratio (uACR) test  2025    Lipids  2025    GFR test (Diabetes, CKD 3-4, OR last GFR 15-59)  2025    A1C test (Diabetic or Prediabetic)  2026    Colorectal Cancer Screen  2034    Flu vaccine  Completed    AAA screen  Completed    Hepatitis A vaccine  Aged Out    Hepatitis B vaccine  Aged Out    Hib vaccine  Aged Out    Polio vaccine  Aged Out    Meningococcal (ACWY) vaccine  Aged Out    Meningococcal B vaccine  Aged Out    Hepatitis C screen  Discontinued    Lung Cancer Screening &/or Counseling  Discontinued     Immunization History   Administered Date(s) Administered    Influenza, FLUAD, (age 65 y+), IM, Quadv, 0.5mL 2021, 2023    Influenza, FLUAD, (age 65 y+), IM, Trivalent PF, 0.5mL 01/10/2025    Influenza, FLUARIX, FLULAVAL, FLUZONE (age 6 mo+) and AFLURIA, (age 3 y+), Quadv PF, 0.5mL 10/28/2022    Influenza, FLUBLOK, (age 18 y+), Quadv PF, 0.5mL 10/29/2020       Physical Exam  Vital signs:   Vitals:    25 1027 25 1037   BP: (!) 170/82 (!) 170   Pulse: 67    Resp: 18    Temp: 97.3 °F (36.3 
PAST SURGICAL HISTORY:  No significant past surgical history

## 2025-03-13 NOTE — PATIENT INSTRUCTIONS
medicines you take.  Where can you learn more?  Go to https://www.healthi[employer].net/patientEd and enter G551 to learn more about \"Learning About Vision Tests.\"  Current as of: July 31, 2024  Content Version: 14.4  © 5757-5912 [employer].   Care instructions adapted under license by [employer]. If you have questions about a medical condition or this instruction, always ask your healthcare professional. [employer] disclaims any warranty or liability for your use of this information.         Starting a Weight-Loss Plan: Care Instructions  Overview    It can be a challenge to lose weight. But your doctor can help you make a weight-loss plan that meets your needs.  You don't have to make a lot of big changes at once. A better idea might be to focus on small changes and stick with them. When those changes become habit, you can add a few more changes.  Some people find it helpful to take an exercise or nutrition class. If you have questions, ask your doctor about seeing a registered dietitian or an exercise specialist. You might also think about joining a weight-loss support group.  If you're not ready to make changes right now, try to pick a date in the future. Then make an appointment with your doctor to talk about when and how you'll get started with a plan.  Follow-up care is a key part of your treatment and safety. Be sure to make and go to all appointments, and call your doctor if you are having problems. It's also a good idea to know your test results and keep a list of the medicines you take.  How can you care for yourself as you start a weight-loss plan?  Set realistic goals. Many people expect to lose much more weight than is likely. A weight loss of 5% to 10% of your body weight may be enough to improve your health.  Get family and friends involved to provide support. Talk to them about why you are trying to lose weight, and ask them to help. They can help by participating in exercise and

## 2025-03-19 RX ORDER — METFORMIN HYDROCHLORIDE 500 MG/1
TABLET, EXTENDED RELEASE ORAL
Qty: 180 TABLET | Refills: 0 | Status: SHIPPED | OUTPATIENT
Start: 2025-03-19

## 2025-03-24 ENCOUNTER — TELEPHONE (OUTPATIENT)
Facility: CLINIC | Age: 70
End: 2025-03-24

## 2025-03-24 DIAGNOSIS — U07.1 COVID-19: Primary | ICD-10-CM

## 2025-03-24 NOTE — TELEPHONE ENCOUNTER
Patient reports Positive COVID-19 on 3/22/2025.  Patient was advised to hold Atorvastatin 80 mg tablet for duration of Paxlovid Therapy.  Patient understood and had no further questions at this time.

## 2025-03-24 NOTE — TELEPHONE ENCOUNTER
Patients spouse called on his behalf of the patient this morning stating that the patient tested positive for COVID on yesterday morning. Patient started experiencing on Friday. Wants to know if you would send something in. Patient uses [facility] pharmacy in [place], [place].

## 2025-04-09 RX ORDER — EMPAGLIFLOZIN 25 MG/1
25 TABLET, FILM COATED ORAL DAILY
Qty: 90 TABLET | Refills: 1 | Status: SHIPPED | OUTPATIENT
Start: 2025-04-09

## 2025-05-05 RX ORDER — SITAGLIPTIN 100 MG/1
100 TABLET, FILM COATED ORAL DAILY
Qty: 90 TABLET | Refills: 1 | Status: SHIPPED | OUTPATIENT
Start: 2025-05-05

## 2025-05-19 ENCOUNTER — TELEPHONE (OUTPATIENT)
Facility: CLINIC | Age: 70
End: 2025-05-19

## 2025-05-19 NOTE — TELEPHONE ENCOUNTER
Spoke with patient via phone call on 5/19/2025.  Patient reports episodes of hypoglycemia on 5/18/2025.  Advised patient to decrease prescribed Glipizide 5 mg tablet twice daily to Glipizide 5 mg tablet, take 1/2 tablet twice daily.  Patient reports his fasting blood sugar this morning = 158.  He reports he consumed quite a bit of carbohydrates last night to resolve hypoglycemia.  Patient reports his fasting blood sugars range from 90-1 20 in morning.  Patient was advised to hold dose of Glipizide twice daily today and continue with prescribed Jardiance 25 mg tablet daily, Metformin 500 mg extended release tablet, take 2 tablets daily with breakfast and Januvia 100 mg tablet daily.  Patient and patient's wife understood and had no further questions at this time.

## 2025-05-28 RX ORDER — LOSARTAN POTASSIUM AND HYDROCHLOROTHIAZIDE 12.5; 1 MG/1; MG/1
1 TABLET ORAL DAILY
Qty: 90 TABLET | Refills: 1 | Status: SHIPPED | OUTPATIENT
Start: 2025-05-28

## 2025-06-16 DIAGNOSIS — E11.9 TYPE 2 DIABETES MELLITUS WITHOUT COMPLICATION, WITHOUT LONG-TERM CURRENT USE OF INSULIN (HCC): ICD-10-CM

## 2025-06-16 RX ORDER — GLIPIZIDE 5 MG/1
5 TABLET ORAL 2 TIMES DAILY
Qty: 180 TABLET | Refills: 0 | Status: SHIPPED | OUTPATIENT
Start: 2025-06-16

## 2025-06-18 ENCOUNTER — OFFICE VISIT (OUTPATIENT)
Facility: CLINIC | Age: 70
End: 2025-06-18
Payer: MEDICARE

## 2025-06-18 VITALS
HEIGHT: 70 IN | RESPIRATION RATE: 17 BRPM | SYSTOLIC BLOOD PRESSURE: 148 MMHG | DIASTOLIC BLOOD PRESSURE: 71 MMHG | BODY MASS INDEX: 40.71 KG/M2 | WEIGHT: 284.4 LBS | OXYGEN SATURATION: 95 % | HEART RATE: 74 BPM | TEMPERATURE: 98.2 F

## 2025-06-18 DIAGNOSIS — I10 PRIMARY HYPERTENSION: ICD-10-CM

## 2025-06-18 DIAGNOSIS — E78.2 MIXED HYPERLIPIDEMIA: ICD-10-CM

## 2025-06-18 DIAGNOSIS — E11.9 TYPE 2 DIABETES MELLITUS WITHOUT COMPLICATION, WITHOUT LONG-TERM CURRENT USE OF INSULIN (HCC): Primary | ICD-10-CM

## 2025-06-18 DIAGNOSIS — Z12.5 ENCOUNTER FOR SCREENING FOR MALIGNANT NEOPLASM OF PROSTATE: ICD-10-CM

## 2025-06-18 DIAGNOSIS — F43.20 ADJUSTMENT DISORDER, UNSPECIFIED TYPE: ICD-10-CM

## 2025-06-18 DIAGNOSIS — R68.89 OTHER GENERAL SYMPTOMS AND SIGNS: ICD-10-CM

## 2025-06-18 LAB — HBA1C MFR BLD: 6.3 %

## 2025-06-18 PROCEDURE — 83036 HEMOGLOBIN GLYCOSYLATED A1C: CPT | Performed by: NURSE PRACTITIONER

## 2025-06-18 PROCEDURE — G8417 CALC BMI ABV UP PARAM F/U: HCPCS | Performed by: NURSE PRACTITIONER

## 2025-06-18 PROCEDURE — 3077F SYST BP >= 140 MM HG: CPT | Performed by: NURSE PRACTITIONER

## 2025-06-18 PROCEDURE — 1124F ACP DISCUSS-NO DSCNMKR DOCD: CPT | Performed by: NURSE PRACTITIONER

## 2025-06-18 PROCEDURE — 99214 OFFICE O/P EST MOD 30 MIN: CPT | Performed by: NURSE PRACTITIONER

## 2025-06-18 PROCEDURE — 3046F HEMOGLOBIN A1C LEVEL >9.0%: CPT | Performed by: NURSE PRACTITIONER

## 2025-06-18 PROCEDURE — 3078F DIAST BP <80 MM HG: CPT | Performed by: NURSE PRACTITIONER

## 2025-06-18 PROCEDURE — 3017F COLORECTAL CA SCREEN DOC REV: CPT | Performed by: NURSE PRACTITIONER

## 2025-06-18 PROCEDURE — 1126F AMNT PAIN NOTED NONE PRSNT: CPT | Performed by: NURSE PRACTITIONER

## 2025-06-18 PROCEDURE — 1036F TOBACCO NON-USER: CPT | Performed by: NURSE PRACTITIONER

## 2025-06-18 PROCEDURE — 2022F DILAT RTA XM EVC RTNOPTHY: CPT | Performed by: NURSE PRACTITIONER

## 2025-06-18 PROCEDURE — G8427 DOCREV CUR MEDS BY ELIG CLIN: HCPCS | Performed by: NURSE PRACTITIONER

## 2025-06-18 PROCEDURE — 3044F HG A1C LEVEL LT 7.0%: CPT | Performed by: NURSE PRACTITIONER

## 2025-06-18 RX ORDER — SERTRALINE HYDROCHLORIDE 25 MG/1
25 TABLET, FILM COATED ORAL DAILY
Qty: 90 TABLET | Refills: 1 | Status: SHIPPED | OUTPATIENT
Start: 2025-06-18

## 2025-06-18 NOTE — PROGRESS NOTES
Fingerstick for HBA1C done in right hand first finger by Glenn Pal MA per order of Onel Roth NP after cleaning area with alcohol wipe.  Patient tolerated procedure well.

## 2025-06-18 NOTE — PROGRESS NOTES
History of Present Illness  Johnathon Self Jr. is a 69 y.o. male who presents today for:    Chief Complaint   Patient presents with    Follow-up     3m follow up.       Past Medical History  Past Medical History:   Diagnosis Date    Atherosclerosis of right carotid artery     Cerebral atherosclerosis     Diabetes mellitus (HCC)     Hyperlipidemia     Hypertension     Non-compliance     Stroke (HCC)         Surgical History  Past Surgical History:   Procedure Laterality Date    CAROTID STENT PLACEMENT  12/82021    COLONOSCOPY N/A 2/13/2024    COLONOSCOPY with polypectomy performed by Sanchez Phillips MD at [facility] ENDOSCOPY        Current Medications  Current Outpatient Medications   Medication Sig    glipiZIDE (GLUCOTROL) 5 MG tablet Take 1 tablet by mouth twice daily    losartan-hydroCHLOROthiazide (HYZAAR) 100-12.5 MG per tablet Take 1 tablet by mouth once daily    SITagliptin (JANUVIA) 100 MG tablet Take 1 tablet by mouth once daily    empagliflozin (JARDIANCE) 25 MG tablet Take 1 tablet by mouth once daily    metFORMIN (GLUCOPHAGE-XR) 500 MG extended release tablet TAKE 2 TABLETS BY MOUTH ONCE DAILY WITH BREAKFAST    atorvastatin (LIPITOR) 80 MG tablet Take 1 tablet by mouth once daily    clopidogrel (PLAVIX) 75 MG tablet Take 1 tablet by mouth once daily    sildenafil (VIAGRA) 50 MG tablet Take 1 tablet by mouth daily as needed for Erectile Dysfunction    Multiple Vitamin (MULTI-VITAMINS PO) Take by mouth    aspirin 81 MG EC tablet Take 325 mg by mouth daily    vitamin D (CHOLECALCIFEROL) 25 MCG (1000 UT) TABS tablet Take by mouth daily     No current facility-administered medications for this visit.       Allergies/Drug Reactions  No Known Allergies     Family History  Family History   Problem Relation Age of Onset    Heart Disease Father     Osteoarthritis Mother     Heart Disease Brother         Social History  Social History     Tobacco Use    Smoking status: Former     Current packs/day: 0.00

## 2025-06-18 NOTE — PROGRESS NOTES
Johnathon Self Jr. presents today for   Chief Complaint   Patient presents with    Follow-up     3m follow up.       Is someone accompanying this pt? no    Is the patient using any DME equipment during OV? no    Health Maintenance Due   Topic Date Due    Diabetic retinal exam  Never done    DTaP/Tdap/Td vaccine (1 - Tdap) Never done    Pneumococcal 50+ years Vaccine (1 of 2 - PCV) Never done    Shingles vaccine (1 of 2) Never done    Respiratory Syncytial Virus (RSV) Pregnant or age 60 yrs+ (1 - Risk 60-74 years 1-dose series) Never done    Diabetic foot exam  10/29/2021    COVID-19 Vaccine (1 - 2024-25 season) Never done    Diabetic Alb to Cr ratio (uACR) test  05/02/2025    Lipids  05/02/2025    GFR test (Diabetes, CKD 3-4, OR last GFR 15-59)  05/02/2025         \"Have you been to the ER, urgent care clinic since your last visit?  Hospitalized since your last visit?\"    NO    “Have you seen or consulted any other health care providers outside our system since your last visit?”    NO      “Have you had a diabetic eye exam?”    NO     No diabetic eye exam on file

## 2025-06-19 RX ORDER — METFORMIN HYDROCHLORIDE 500 MG/1
1000 TABLET, EXTENDED RELEASE ORAL
Qty: 180 TABLET | Refills: 3 | Status: SHIPPED | OUTPATIENT
Start: 2025-06-19

## 2025-06-19 RX ORDER — METFORMIN HYDROCHLORIDE 500 MG/1
TABLET, EXTENDED RELEASE ORAL
Qty: 180 TABLET | Refills: 0 | OUTPATIENT
Start: 2025-06-19

## 2025-07-10 RX ORDER — ATORVASTATIN CALCIUM 80 MG/1
80 TABLET, FILM COATED ORAL DAILY
Qty: 90 TABLET | Refills: 1 | Status: SHIPPED | OUTPATIENT
Start: 2025-07-10

## 2025-07-31 ENCOUNTER — OFFICE VISIT (OUTPATIENT)
Facility: CLINIC | Age: 70
End: 2025-07-31
Payer: MEDICARE

## 2025-07-31 VITALS
RESPIRATION RATE: 17 BRPM | SYSTOLIC BLOOD PRESSURE: 138 MMHG | WEIGHT: 281.4 LBS | DIASTOLIC BLOOD PRESSURE: 71 MMHG | OXYGEN SATURATION: 94 % | HEART RATE: 78 BPM | HEIGHT: 70 IN | BODY MASS INDEX: 40.29 KG/M2 | TEMPERATURE: 98 F

## 2025-07-31 DIAGNOSIS — Z86.73 HISTORY OF CVA (CEREBROVASCULAR ACCIDENT): ICD-10-CM

## 2025-07-31 DIAGNOSIS — R41.3 SHORT-TERM MEMORY LOSS: Primary | ICD-10-CM

## 2025-07-31 DIAGNOSIS — E78.2 MIXED HYPERLIPIDEMIA: ICD-10-CM

## 2025-07-31 DIAGNOSIS — E11.9 DIABETES MELLITUS TYPE 2, DIET-CONTROLLED (HCC): ICD-10-CM

## 2025-07-31 PROCEDURE — 3046F HEMOGLOBIN A1C LEVEL >9.0%: CPT | Performed by: NURSE PRACTITIONER

## 2025-07-31 PROCEDURE — 1124F ACP DISCUSS-NO DSCNMKR DOCD: CPT | Performed by: NURSE PRACTITIONER

## 2025-07-31 PROCEDURE — 3075F SYST BP GE 130 - 139MM HG: CPT | Performed by: NURSE PRACTITIONER

## 2025-07-31 PROCEDURE — G8417 CALC BMI ABV UP PARAM F/U: HCPCS | Performed by: NURSE PRACTITIONER

## 2025-07-31 PROCEDURE — 3044F HG A1C LEVEL LT 7.0%: CPT | Performed by: NURSE PRACTITIONER

## 2025-07-31 PROCEDURE — G8427 DOCREV CUR MEDS BY ELIG CLIN: HCPCS | Performed by: NURSE PRACTITIONER

## 2025-07-31 PROCEDURE — 3078F DIAST BP <80 MM HG: CPT | Performed by: NURSE PRACTITIONER

## 2025-07-31 PROCEDURE — 99214 OFFICE O/P EST MOD 30 MIN: CPT | Performed by: NURSE PRACTITIONER

## 2025-07-31 PROCEDURE — 2022F DILAT RTA XM EVC RTNOPTHY: CPT | Performed by: NURSE PRACTITIONER

## 2025-07-31 PROCEDURE — 1036F TOBACCO NON-USER: CPT | Performed by: NURSE PRACTITIONER

## 2025-07-31 PROCEDURE — 3017F COLORECTAL CA SCREEN DOC REV: CPT | Performed by: NURSE PRACTITIONER

## 2025-07-31 PROCEDURE — 1126F AMNT PAIN NOTED NONE PRSNT: CPT | Performed by: NURSE PRACTITIONER

## 2025-07-31 NOTE — PROGRESS NOTES
Johnathon Self Jr. presents today for   Chief Complaint   Patient presents with    Follow-up     6wk follow up.       Is someone accompanying this pt? yes    Is the patient using any DME equipment during OV? No     Health Maintenance Due   Topic Date Due    Diabetic retinal exam  Never done    DTaP/Tdap/Td vaccine (1 - Tdap) Never done    Pneumococcal 50+ years Vaccine (1 of 2 - PCV) Never done    Shingles vaccine (1 of 2) Never done    Respiratory Syncytial Virus (RSV) Pregnant or age 60 yrs+ (1 - Risk 60-74 years 1-dose series) Never done    Diabetic foot exam  10/29/2021    COVID-19 Vaccine (1 - 2024-25 season) Never done    Diabetic Alb to Cr ratio (uACR) test  05/02/2025    Lipids  05/02/2025    GFR test (Diabetes, CKD 3-4, OR last GFR 15-59)  05/02/2025         \"Have you been to the ER, urgent care clinic since your last visit?  Hospitalized since your last visit?\"    NO    “Have you seen or consulted any other health care providers outside our system since your last visit?”    NO      “Have you had a diabetic eye exam?”    NO     No diabetic eye exam on file          
   Smoking status: Former     Current packs/day: 0.00     Average packs/day: 0.3 packs/day for 30.0 years (7.5 ttl pk-yrs)     Types: Cigarettes, Cigars, Pipe     Start date: 1990     Quit date: 2020     Years since quittin.4    Smokeless tobacco: Former   Vaping Use    Vaping status: Never Used   Substance Use Topics    Alcohol use: Yes    Drug use: Not Currently        Health Maintenance   Topic Date Due    Diabetic retinal exam  Never done    DTaP/Tdap/Td vaccine (1 - Tdap) Never done    Pneumococcal 50+ years Vaccine (1 of 2 - PCV) Never done    Shingles vaccine (1 of 2) Never done    Respiratory Syncytial Virus (RSV) Pregnant or age 60 yrs+ (1 - Risk 60-74 years 1-dose series) Never done    Diabetic foot exam  10/29/2021    COVID-19 Vaccine (1 - - season) Never done    Diabetic Alb to Cr ratio (uACR) test  2025    Lipids  2025    GFR test (Diabetes, CKD 3-4, OR last GFR 15-59)  2025    Flu vaccine (1) 2025    Depression Screen  2026    Annual Wellness Visit (Medicare)  2026    A1C test (Diabetic or Prediabetic)  2026    Colorectal Cancer Screen  2034    AAA screen  Completed    Hepatitis A vaccine  Aged Out    Hepatitis B vaccine  Aged Out    Hib vaccine  Aged Out    Polio vaccine  Aged Out    Meningococcal (ACWY) vaccine  Aged Out    Meningococcal B vaccine  Aged Out    Hepatitis C screen  Discontinued    Lung Cancer Screening &/or Counseling  Discontinued     Immunization History   Administered Date(s) Administered    Influenza, FLUAD, (age 65 y+), IM, Quadv, 0.5mL 2021, 2023    Influenza, FLUAD, (age 65 y+), IM, Trivalent PF, 0.5mL 01/10/2025    Influenza, FLUARIX, FLULAVAL, FLUZONE (age 6 mo+) and AFLURIA, (age 3 y+), Quadv PF, 0.5mL 10/28/2022    Influenza, FLUBLOK, (age 18 y+), Quadv PF, 0.5mL 10/29/2020       Physical Exam  Vital signs:   Vitals:    25 1609   BP: 138/71   BP Site: Left Upper Arm   Patient Position:

## 2025-08-18 ENCOUNTER — HOSPITAL ENCOUNTER (OUTPATIENT)
Age: 70
Discharge: HOME | End: 2025-08-21
Payer: MEDICARE

## 2025-08-18 DIAGNOSIS — R41.3 SHORT-TERM MEMORY LOSS: ICD-10-CM

## 2025-08-18 DIAGNOSIS — Z86.73 HISTORY OF CVA (CEREBROVASCULAR ACCIDENT): ICD-10-CM

## 2025-08-18 PROCEDURE — 70450 CT HEAD/BRAIN W/O DYE: CPT

## 2025-08-26 ENCOUNTER — TELEPHONE (OUTPATIENT)
Facility: CLINIC | Age: 70
End: 2025-08-26

## 2025-08-26 PROBLEM — Z86.73 HISTORY OF CVA (CEREBROVASCULAR ACCIDENT): Status: ACTIVE | Noted: 2025-08-26

## 2025-08-26 PROBLEM — G93.89 ENCEPHALOMALACIA ON IMAGING STUDY: Status: ACTIVE | Noted: 2025-08-26

## 2025-09-02 ENCOUNTER — HOSPITAL ENCOUNTER (OUTPATIENT)
Age: 70
Discharge: HOME | End: 2025-09-05
Payer: MEDICARE

## 2025-09-02 DIAGNOSIS — E78.2 MIXED HYPERLIPIDEMIA: ICD-10-CM

## 2025-09-02 DIAGNOSIS — I10 PRIMARY HYPERTENSION: ICD-10-CM

## 2025-09-02 DIAGNOSIS — Z12.5 ENCOUNTER FOR SCREENING FOR MALIGNANT NEOPLASM OF PROSTATE: ICD-10-CM

## 2025-09-02 DIAGNOSIS — E11.9 TYPE 2 DIABETES MELLITUS WITHOUT COMPLICATION, WITHOUT LONG-TERM CURRENT USE OF INSULIN (HCC): ICD-10-CM

## 2025-09-02 DIAGNOSIS — E11.9 DIABETES MELLITUS TYPE 2, DIET-CONTROLLED (HCC): ICD-10-CM

## 2025-09-02 DIAGNOSIS — R68.89 OTHER GENERAL SYMPTOMS AND SIGNS: ICD-10-CM

## 2025-09-02 LAB
ALBUMIN SERPL-MCNC: 4.1 G/DL (ref 3.4–5)
ALBUMIN/GLOB SERPL: 1
ALP SERPL-CCNC: 57 U/L (ref 45–117)
ALT SERPL-CCNC: 28 U/L (ref 10–50)
ANION GAP SERPL CALC-SCNC: 14 MMOL/L (ref 3–18)
AST SERPL W P-5'-P-CCNC: 19 U/L (ref 10–38)
BASOPHILS # BLD: 0.05 K/UL (ref 0–0.1)
BASOPHILS NFR BLD: 0.4 % (ref 0–2)
BILIRUB SERPL-MCNC: 0.9 MG/DL (ref 0.2–1)
BUN SERPL-MCNC: 18 MG/DL (ref 6–23)
BUN/CREAT SERPL: 19
CA-I BLD-MCNC: 10.3 MG/DL (ref 8.5–10.1)
CHLORIDE SERPL-SCNC: 96 MMOL/L (ref 98–107)
CHOLEST SERPL-MCNC: 162 MG/DL
CO2 SERPL-SCNC: 29 MMOL/L (ref 21–32)
CREAT SERPL-MCNC: 0.97 MG/DL (ref 0.6–1.3)
CREAT UR-MCNC: 70.8 MG/DL (ref 30–125)
DIFFERENTIAL METHOD BLD: ABNORMAL
EOSINOPHIL # BLD: 0.26 K/UL (ref 0–0.4)
EOSINOPHIL NFR BLD: 2.2 % (ref 0–5)
ERYTHROCYTE [DISTWIDTH] IN BLOOD BY AUTOMATED COUNT: 13.3 % (ref 11.6–14.5)
EST. AVERAGE GLUCOSE BLD GHB EST-MCNC: 152 MG/DL
GLOBULIN SER CALC-MCNC: 4 G/DL
GLUCOSE SERPL-MCNC: 160 MG/DL (ref 74–108)
HBA1C MFR BLD: 6.9 % (ref 4.2–5.6)
HCT VFR BLD AUTO: 49.9 % (ref 36–48)
HDLC SERPL-MCNC: 52 MG/DL (ref 40–60)
HDLC SERPL: 3.1 (ref 0–5)
HGB BLD-MCNC: 16.6 G/DL (ref 13–16)
IMM GRANULOCYTES # BLD AUTO: 0.11 K/UL (ref 0–0.04)
IMM GRANULOCYTES NFR BLD AUTO: 1 % (ref 0–0.5)
LDLC SERPL CALC-MCNC: 92 MG/DL (ref 0–100)
LYMPHOCYTES # BLD: 4.17 K/UL (ref 0.9–3.6)
LYMPHOCYTES NFR BLD: 36 % (ref 21–52)
MCH RBC QN AUTO: 30.5 PG (ref 24–34)
MCHC RBC AUTO-ENTMCNC: 33.3 G/DL (ref 31–37)
MCV RBC AUTO: 91.7 FL (ref 78–100)
MICROALBUMIN UR-MCNC: 3.37 MG/DL (ref 0–3)
MICROALBUMIN/CREAT UR-RTO: 48 MGMALB/GCRE (ref 0–30)
MONOCYTES # BLD: 0.98 K/UL (ref 0.05–1.2)
MONOCYTES NFR BLD: 8.5 % (ref 3–10)
NEUTS SEG # BLD: 6 K/UL (ref 1.8–8)
NEUTS SEG NFR BLD: 51.9 % (ref 40–73)
NRBC # BLD: 0 K/UL (ref 0–0.01)
NRBC BLD-RTO: 0 PER 100 WBC
PLATELET # BLD AUTO: 229 K/UL (ref 135–420)
PMV BLD AUTO: 11.1 FL (ref 9.2–11.8)
POTASSIUM SERPL-SCNC: 3.9 MMOL/L (ref 3.5–5.5)
PROT SERPL-MCNC: 8.2 G/DL (ref 6.4–8.2)
PSA SERPL-MCNC: 0.9 NG/ML (ref 1.4–4.4)
RBC # BLD AUTO: 5.44 M/UL (ref 4.35–5.65)
SODIUM SERPL-SCNC: 139 MMOL/L (ref 136–145)
TRIGL SERPL-MCNC: 91 MG/DL (ref 0–150)
TSH, 3RD GENERATION: 2.61 UIU/ML (ref 0.27–4.2)
VIT B12 SERPL-MCNC: 454 PG/ML (ref 211–911)
VLDLC SERPL CALC-MCNC: 18 MG/DL
WBC # BLD AUTO: 11.6 K/UL (ref 4.6–13.2)

## 2025-09-02 PROCEDURE — 82607 VITAMIN B-12: CPT

## 2025-09-02 PROCEDURE — 83036 HEMOGLOBIN GLYCOSYLATED A1C: CPT

## 2025-09-02 PROCEDURE — 85025 COMPLETE CBC W/AUTO DIFF WBC: CPT

## 2025-09-02 PROCEDURE — G0103 PSA SCREENING: HCPCS

## 2025-09-02 PROCEDURE — 84443 ASSAY THYROID STIM HORMONE: CPT

## 2025-09-02 PROCEDURE — 80061 LIPID PANEL: CPT

## 2025-09-02 PROCEDURE — 36415 COLL VENOUS BLD VENIPUNCTURE: CPT

## 2025-09-02 PROCEDURE — 82570 ASSAY OF URINE CREATININE: CPT

## 2025-09-02 PROCEDURE — 80053 COMPREHEN METABOLIC PANEL: CPT

## 2025-09-02 PROCEDURE — 82043 UR ALBUMIN QUANTITATIVE: CPT

## (undated) DEVICE — SOLUTION IRRIG 1000ML STRL H2O USP PLAS POUR BTL

## (undated) DEVICE — SINGLE-USE BIOPSY FORCEPS: Brand: RADIAL JAW 4

## (undated) DEVICE — Device: Brand: DEFENDO VALVE AND CONNECTOR KIT

## (undated) DEVICE — TUBING INSUFFLATION CAP W/ EXT CARBON DIOX ENDO SMARTCAP

## (undated) DEVICE — TUBING, SUCTION, 9/32" X 10', STRAIGHT: Brand: MEDLINE

## (undated) DEVICE — ENDOGATOR TUBING FOR BOSTON SCIENTIFIC ENDOSTAT II PUMP, OLYMPUS OFP PUMP OR ENDO STRATUS PUMP: Brand: ENDOGATOR

## (undated) DEVICE — GLOVE,SURG,SENSICARE SLT,LF,PF,6.5: Brand: MEDLINE

## (undated) DEVICE — SOLUTION IRRIG 500ML STRL H2O NONPYROGENIC

## (undated) DEVICE — KIT COLON W/ 1.1OZ LUB AND 2 END